# Patient Record
Sex: FEMALE | Race: WHITE | Employment: OTHER | URBAN - METROPOLITAN AREA
[De-identification: names, ages, dates, MRNs, and addresses within clinical notes are randomized per-mention and may not be internally consistent; named-entity substitution may affect disease eponyms.]

---

## 2017-01-17 ENCOUNTER — HOSPITAL ENCOUNTER (OUTPATIENT)
Dept: MAMMOGRAPHY | Age: 72
Discharge: HOME | End: 2017-01-17
Attending: FAMILY MEDICINE
Payer: MEDICARE

## 2017-01-17 DIAGNOSIS — Z12.31 VISIT FOR SCREENING MAMMOGRAM: ICD-10-CM

## 2017-01-17 PROCEDURE — 77063 BREAST TOMOSYNTHESIS BI: CPT

## 2017-11-08 ENCOUNTER — TELEPHONE (OUTPATIENT)
Dept: CARDIOLOGY CLINIC | Age: 72
End: 2017-11-08

## 2017-11-08 NOTE — TELEPHONE ENCOUNTER
Pt would like to speak with you regarding her BP medication cost.  Pt can be reached at [phone].       Thank you,  Nancy Curry

## 2017-11-08 NOTE — TELEPHONE ENCOUNTER
She said she has been taking benicar for years but it is costing $1500/year ? I told her that Dr Emily Armando could not make any recommendations since we have not seen her in over 2 years. I told her to expect a call from my .

## 2018-01-22 ENCOUNTER — HOSPITAL ENCOUNTER (OUTPATIENT)
Dept: MAMMOGRAPHY | Age: 73
Discharge: HOME | End: 2018-01-22
Attending: FAMILY MEDICINE
Payer: MEDICARE

## 2018-01-22 DIAGNOSIS — Z12.39 SCREENING BREAST EXAMINATION: ICD-10-CM

## 2018-01-22 PROCEDURE — 77063 BREAST TOMOSYNTHESIS BI: CPT

## 2018-05-10 RX ORDER — VALSARTAN AND HYDROCHLOROTHIAZIDE 160; 12.5 MG/1; MG/1
1 TABLET, FILM COATED ORAL DAILY
COMMUNITY
End: 2019-03-01

## 2018-05-10 NOTE — PERIOP NOTES
[address] [place] [address] Dr Coley Preprocedure Instructions      1. On the day of your surgery, please report to registration located on the 2nd floor of the  [facility]. yes    2. You must have a responsible adult to drive you to the hospital, stay at the hospital during your procedure and drive you home. If they leave your procedure will not be started (no exceptions). yes    3. Do not have anything to eat or drink (including water, gum, mints, coffee, and juice) after midnight. This does not apply to the medications you were instructed to take by your physician. yesIf you are currently taking Plavix, Coumadin, Aspirin, or other blood-thinning agents, contact your physician for special instructions. not applicable,    4. If you are having a procedure that requires bowel prep: We recommend that you have only clear liquids the day before your procedure and begin your bowel prep by 5:00 pm.  You may continue to drink clear liquids until midnight. If for any reason you are not able to complete your prep please notify your physician immediately. yes    5. Have a list of all current medications, including vitamins, herbal supplements and any other over the counter medications. yes    6. If you wear glasses, contacts, dentures and/or hearing aids, they may be removed prior to procedure, please bring a case to store them in. yes    7. You should understand that if you do not follow these instructions your procedure may be cancelled. If your physical condition changes (I.e. fever, cold or flu) please contact your doctor as soon as possible. 8. It is important that you be on time.   If for any reason you are unable to keep your appointment please call [phone] the day of or your physicians office prior to your scheduled procedure

## 2018-05-15 ENCOUNTER — ANESTHESIA EVENT (OUTPATIENT)
Dept: ENDOSCOPY | Age: 73
End: 2018-05-15
Payer: MEDICARE

## 2018-05-15 ENCOUNTER — HOSPITAL ENCOUNTER (OUTPATIENT)
Age: 73
Setting detail: OUTPATIENT SURGERY
Discharge: HOME | End: 2018-05-15
Attending: SPECIALIST | Admitting: SPECIALIST
Payer: MEDICARE

## 2018-05-15 ENCOUNTER — ANESTHESIA (OUTPATIENT)
Dept: ENDOSCOPY | Age: 73
End: 2018-05-15
Payer: MEDICARE

## 2018-05-15 VITALS
HEIGHT: 65 IN | HEART RATE: 67 BPM | SYSTOLIC BLOOD PRESSURE: 138 MMHG | BODY MASS INDEX: 24.49 KG/M2 | RESPIRATION RATE: 22 BRPM | TEMPERATURE: 97.6 F | OXYGEN SATURATION: 94 % | WEIGHT: 147 LBS | DIASTOLIC BLOOD PRESSURE: 68 MMHG

## 2018-05-15 PROCEDURE — 74011000250 HC RX REV CODE- 250

## 2018-05-15 PROCEDURE — 77030013992 HC SNR POLYP ENDOSC BSC -B: Performed by: SPECIALIST

## 2018-05-15 PROCEDURE — 74011250636 HC RX REV CODE- 250/636: Performed by: PHYSICIAN ASSISTANT

## 2018-05-15 PROCEDURE — 74011250636 HC RX REV CODE- 250/636

## 2018-05-15 PROCEDURE — 76060000031 HC ANESTHESIA FIRST 0.5 HR: Performed by: SPECIALIST

## 2018-05-15 PROCEDURE — 88305 TISSUE EXAM BY PATHOLOGIST: CPT | Performed by: SPECIALIST

## 2018-05-15 PROCEDURE — 76040000019: Performed by: SPECIALIST

## 2018-05-15 RX ORDER — MIDAZOLAM HYDROCHLORIDE 1 MG/ML
.25-5 INJECTION, SOLUTION INTRAMUSCULAR; INTRAVENOUS AS NEEDED
Status: DISCONTINUED | OUTPATIENT
Start: 2018-05-15 | End: 2018-05-15 | Stop reason: HOSPADM

## 2018-05-15 RX ORDER — ATROPINE SULFATE 0.1 MG/ML
0.5 INJECTION INTRAVENOUS
Status: DISCONTINUED | OUTPATIENT
Start: 2018-05-15 | End: 2018-05-15 | Stop reason: HOSPADM

## 2018-05-15 RX ORDER — PROPOFOL 10 MG/ML
INJECTION, EMULSION INTRAVENOUS AS NEEDED
Status: DISCONTINUED | OUTPATIENT
Start: 2018-05-15 | End: 2018-05-15 | Stop reason: HOSPADM

## 2018-05-15 RX ORDER — SODIUM CHLORIDE 9 MG/ML
50 INJECTION, SOLUTION INTRAVENOUS CONTINUOUS
Status: DISCONTINUED | OUTPATIENT
Start: 2018-05-15 | End: 2018-05-15 | Stop reason: HOSPADM

## 2018-05-15 RX ORDER — DEXTROMETHORPHAN/PSEUDOEPHED 2.5-7.5/.8
1.2 DROPS ORAL
Status: DISCONTINUED | OUTPATIENT
Start: 2018-05-15 | End: 2018-05-15 | Stop reason: HOSPADM

## 2018-05-15 RX ORDER — EPINEPHRINE 0.1 MG/ML
1 INJECTION INTRACARDIAC; INTRAVENOUS
Status: DISCONTINUED | OUTPATIENT
Start: 2018-05-15 | End: 2018-05-15 | Stop reason: HOSPADM

## 2018-05-15 RX ORDER — SODIUM CHLORIDE 9 MG/ML
INJECTION, SOLUTION INTRAVENOUS
Status: DISCONTINUED | OUTPATIENT
Start: 2018-05-15 | End: 2018-05-15 | Stop reason: HOSPADM

## 2018-05-15 RX ORDER — PROPOFOL 10 MG/ML
INJECTION, EMULSION INTRAVENOUS
Status: DISCONTINUED | OUTPATIENT
Start: 2018-05-15 | End: 2018-05-15 | Stop reason: HOSPADM

## 2018-05-15 RX ORDER — LIDOCAINE HYDROCHLORIDE 20 MG/ML
INJECTION, SOLUTION EPIDURAL; INFILTRATION; INTRACAUDAL; PERINEURAL AS NEEDED
Status: DISCONTINUED | OUTPATIENT
Start: 2018-05-15 | End: 2018-05-15 | Stop reason: HOSPADM

## 2018-05-15 RX ORDER — FLUMAZENIL 0.1 MG/ML
0.2 INJECTION INTRAVENOUS
Status: DISCONTINUED | OUTPATIENT
Start: 2018-05-15 | End: 2018-05-15 | Stop reason: HOSPADM

## 2018-05-15 RX ORDER — FENTANYL CITRATE 50 UG/ML
25 INJECTION, SOLUTION INTRAMUSCULAR; INTRAVENOUS AS NEEDED
Status: DISCONTINUED | OUTPATIENT
Start: 2018-05-15 | End: 2018-05-15 | Stop reason: HOSPADM

## 2018-05-15 RX ORDER — NALOXONE HYDROCHLORIDE 0.4 MG/ML
0.4 INJECTION, SOLUTION INTRAMUSCULAR; INTRAVENOUS; SUBCUTANEOUS
Status: DISCONTINUED | OUTPATIENT
Start: 2018-05-15 | End: 2018-05-15 | Stop reason: HOSPADM

## 2018-05-15 RX ADMIN — PROPOFOL 100 MCG/KG/MIN: 10 INJECTION, EMULSION INTRAVENOUS at 09:07

## 2018-05-15 RX ADMIN — SODIUM CHLORIDE: 9 INJECTION, SOLUTION INTRAVENOUS at 08:45

## 2018-05-15 RX ADMIN — LIDOCAINE HYDROCHLORIDE 60 MG: 20 INJECTION, SOLUTION EPIDURAL; INFILTRATION; INTRACAUDAL; PERINEURAL at 09:07

## 2018-05-15 RX ADMIN — SODIUM CHLORIDE 50 ML/HR: 900 INJECTION, SOLUTION INTRAVENOUS at 08:27

## 2018-05-15 RX ADMIN — PROPOFOL 70 MG: 10 INJECTION, EMULSION INTRAVENOUS at 09:07

## 2018-05-15 NOTE — H&P
75 y.o. female for open access colonoscopy for screening   Additional data for completion of the targeted pre-endoscopy H&P will be provided under 'H&P interval notes'. Please see that document which will be attached to this.   Nicole Alvarez MD  Colon 10 years ago woogen

## 2018-05-15 NOTE — ANESTHESIA POSTPROCEDURE EVALUATION
Post-Anesthesia Evaluation and Assessment    Patient: César Varela MRN: [id number]  SSN: [id number]    YOB: 1945  Age: 75 y.o. Sex: female       Cardiovascular Function/Vital Signs  Visit Vitals    /66    Pulse 66    Temp 36.4 °C (97.6 °F)    Resp 12    Ht 5' 5.05\" (1.652 m)    Wt 66.7 kg (147 lb)    SpO2 92%    Breastfeeding No    BMI 24.42 kg/m2       Patient is status post MAC anesthesia for Procedure(s):  COLONOSCOPY  ENDOSCOPIC POLYPECTOMY. Nausea/Vomiting: None    Postoperative hydration reviewed and adequate. Pain:  Pain Scale 1: Numeric (0 - 10) (05/15/18 0942)  Pain Intensity 1: 0 (05/15/18 0942)   Managed    Neurological Status: At baseline    Mental Status and Level of Consciousness: Arousable    Pulmonary Status:   O2 Device: Room air (05/15/18 0942)   Adequate oxygenation and airway patent    Complications related to anesthesia: None    Post-anesthesia assessment completed.  No concerns    Signed By: Haylee Carrion MD     May 15, 2018

## 2018-05-15 NOTE — PERIOP NOTES
Report from Shauna, [facility], see anesthesia record. ABD remains soft and non-tender post procedure. Pt has no complaints at this time and tolerated the procedure well. Endoscope was pre-cleaned at bedside immediately following procedure by Jennifer Warner.

## 2018-05-15 NOTE — ANESTHESIA PREPROCEDURE EVALUATION
Anesthetic History   No history of anesthetic complications            Review of Systems / Medical History  Patient summary reviewed and pertinent labs reviewed    Pulmonary  Within defined limits            Pertinent negatives: No smoker     Neuro/Psych   Within defined limits           Cardiovascular    Hypertension          Hyperlipidemia    Exercise tolerance: >4 METS     GI/Hepatic/Renal     GERD: well controlled           Endo/Other             Other Findings   Comments: Seasonal allergies           Physical Exam    Airway  Mallampati: II  TM Distance: < 4 cm  Neck ROM: normal range of motion   Mouth opening: Normal     Cardiovascular  Regular rate and rhythm,  S1 and S2 normal,  no murmur, click, rub, or gallop  Rhythm: regular  Rate: normal         Dental  No notable dental hx       Pulmonary  Breath sounds clear to auscultation               Abdominal         Other Findings            Anesthetic Plan    ASA: 2  Anesthesia type: MAC          Induction: Intravenous  Anesthetic plan and risks discussed with: Patient

## 2018-05-15 NOTE — DISCHARGE INSTRUCTIONS
[address] [facility] MARITZA Pollard MD  [phone]      May 15, 2018    Anton Pearson  YOB: 1945    COLONOSCOPY DISCHARGE INSTRUCTIONS    If there is redness at IV site you should apply warm compress to area. If redness or soreness persist contact Dr. Charissa Pollard' or your primary care doctor. There may be a slight amount of blood passed from the rectum. Gaseous discomfort may develop, but walking, belching will help relieve this. You may not operate a vehicle for 12 hours  You may not operate machinery or dangerous appliances for rest of today  You may not drink alcoholic beverages for 12 hours  Avoid making any critical decisions for 24 hours    DIET:  You may resume your normal diet, but some patients find that heavy or large meals may lead to indigestion or vomiting. I suggest a light meal as first food intake. MEDICATIONS:  The use of some over-the-counter pain medication may lead to bleeding after colon biopsies or polyp removal.  Tylenol (also called acetaminophen) is safe to take even if you have had colonoscopy with polyp removal.  Based on the procedure you had today you may not safely take aspirin or aspirin-like products for the next ten (10) days. Remember that Tylenol (also called acetaminophen) is safe to take after colonoscopy even if you have had biopsies or polyps removed. ACTIVITY:  You may resume your normal household activities, but it is recommended that you spend the remainder of the day resting -  avoid any strenuous activity. CALL DR. Porsha Mclaughlin' OFFICE IF:  Increasing pain, nausea, vomiting  Abdominal distension (swelling)  Significant new or increased bleeding (oral or rectal)  Fever/Chills  Chest pain/shortness of breath                       Additional instructions:   No aspirin 10 days  We found one small polyp and removed that.   I will contact you by letter in about a week with the polyp results and advice about next colonoscopy. It was an honor to be your doctor today. Please let me or my office staff know if you have any feedback about today's procedure. Michelle Alvarez MD    Colonoscopy saves lives, and can prevent colon cancer. Everyone aged 48 or older needs colonoscopy.   Tell your family and friends: get the test!

## 2018-05-15 NOTE — INTERVAL H&P NOTE
Pre-Endoscopy H&P Update  Chief complaint/HPI/ROS:  The indication for the procedure, the patient's history and the patient's current medications are reviewed prior to the procedure and that data is reported on the H&P to which this document is attached. Any significant complaints with regard to organ systems will be noted, and if not mentioned then a review of systems is not contributory. Past Medical History:   Diagnosis Date    Borderline diabetes     Essential hypertension     FH ischemic heart disease     Hyperlipidemia       Past Surgical History:   Procedure Laterality Date    HX HEART CATHETERIZATION  1996    HX HEENT      cataracts     Social   Social History   Substance Use Topics    Smoking status: Never Smoker    Smokeless tobacco: Never Used    Alcohol use No      Family History   Problem Relation Age of Onset    Diabetes Mother     Diabetes Father     Heart Attack Father     Heart Attack Brother       Allergies   Allergen Reactions    Sulfa (Sulfonamide Antibiotics) Unknown (comments)      Prior to Admission Medications   Prescriptions Last Dose Informant Patient Reported? Taking? L GASSERI/B BIFIDUM/B LONGUM (PROBIOTIC COLON CARE PO) 4/15/2018 at Unknown time  Yes Yes   Sig: Take  by mouth. LORATADINE (ALAVERT PO) 5/14/2018 at Unknown time  Yes Yes   Sig: Take  by mouth. amLODIPine (NORVASC) 2.5 mg tablet 5/15/2018 at Unknown time  Yes Yes   Sig: Take  by mouth daily. asa-acetaminophen-caff-buffers [facility]) 812-055-27 mg tab 5/12/2018  Yes No   Sig: Take  by mouth. aspirin delayed-release 81 mg tablet 5/14/2018 at Unknown time  Yes Yes   Sig: Take  by mouth daily. calcium carbonate-simethicone (MAGGI-SELTZER HEARTBURN+GAS) 750-80 mg chew 4/15/2018 at Unknown time  Yes Yes   Sig: Take  by mouth.   calcium-cholecalciferol, d3, (CALCIUM 600 + D) 600-125 mg-unit tab 5/14/2018 at Unknown time  Yes Yes   Sig: Take  by mouth daily.    famotidine (PEPCID AC) 10 mg tablet 5/11/2018 Yes No   Sig: Take 10 mg by mouth two (2) times a day. multivitamins-minerals-lutein (MULTIVITAMIN 50 PLUS) tab tablet 5/14/2018 at Unknown time  Yes Yes   Sig: Take 1 Tab by mouth daily. simvastatin (ZOCOR) 40 mg tablet 5/14/2018 at Unknown time  Yes Yes   Sig: Take  by mouth nightly. valsartan-hydroCHLOROthiazide (DIOVAN-HCT) 160-12.5 mg per tablet 5/15/2018 at Unknown time  Yes Yes   Sig: Take 1 Tab by mouth daily. Facility-Administered Medications: None       PHYSICAL EXAM:  The patient is examined immediately prior to the procedure. Visit Vitals    /74    Pulse 72    Temp 97.6 °F (36.4 °C)    Resp 13    Ht 5' 5.05\" (1.652 m)    Wt 66.7 kg (147 lb)    SpO2 100%    Breastfeeding No    BMI 24.42 kg/m2     Gen: Appears comfortable, no distress. Pulm: comfortable respirations with no abnormal audible breath sounds  CV: heart regular, well perfused  GI: abdomen flat. PLAN:  Informed consent discussion held, patient afforded an opportunity to ask questions and all questions answered. After being advised of the risks, benefits, and alternatives, the patient requested that we proceed and indicated so on a written consent form. Will proceed with procedure as planned.   Victor M Hoffman MD

## 2018-05-15 NOTE — IP AVS SNAPSHOT
[address] 
[phone] Patient: Elvi Esparza MRN: [id number] SE/3/2164 About your hospitalization You were admitted on:  May 15, 2018 You last received care in the:  [facility] ENDOSCOPY You were discharged on:  May 15, 2018 Why you were hospitalized Your primary diagnosis was:  Not on File Follow-up Information None Discharge Orders None A check shane indicates which time of day the medication should be taken. My Medications CONTINUE taking these medications Instructions Each Dose to Equal  
 Morning Noon Evening Bedtime ALAVERT PO Your last dose was: Your next dose is: Take  by mouth. MAGGI-SELTZER HEARTBURN+-80 mg documistic Generic drug:  calcium carbonate-simethicone Your last dose was: Your next dose is: Take  by mouth. amLODIPine 2.5 mg tablet Commonly known as:  Max Vega Your last dose was: Your next dose is: Take  by mouth daily. aspirin delayed-release 81 mg tablet Your last dose was: Your next dose is: Take  by mouth daily. CALCIUM 600 + D 600-125 mg-unit Tab Generic drug:  calcium-cholecalciferol (d3) Your last dose was: Your next dose is: Take  by mouth daily. MULTIVITAMIN 50 PLUS Tab tablet Generic drug:  multivitamins-minerals-lutein Your last dose was: Your next dose is: Take 1 Tab by mouth daily. 1 Tab PEPCID AC 10 mg tablet Generic drug:  famotidine Your last dose was: Your next dose is: Take 10 mg by mouth two (2) times a day. 10 mg  PROBIOTIC COLON CARE PO  
   
 Your last dose was: Your next dose is: Take  by mouth. simvastatin 40 mg tablet Commonly known as:  ZOCOR Your last dose was: Your next dose is: Take  by mouth nightly. valsartan-hydroCHLOROthiazide 160-12.5 mg per tablet Commonly known as:  DIOVAN-HCT Your last dose was: Your next dose is: Take 1 Tab by mouth daily. 1 Tab  
    
   
   
   
  
 VANQUISH 541-536-05 mg Tab Generic drug:  asa-acetaminophen-caff-buffers Your last dose was: Your next dose is: Take  by mouth. Discharge Instructions [address] Ric Hwang. Anjali Hayden, [address] [place] [address] 
[phone] May 15, 2018 Angel Ramsay YOB: 1945 COLONOSCOPY DISCHARGE INSTRUCTIONS If there is redness at IV site you should apply warm compress to area. If redness or soreness persist contact Dr. Anjali Hayden' or your primary care doctor. There may be a slight amount of blood passed from the rectum. Gaseous discomfort may develop, but walking, belching will help relieve this. You may not operate a vehicle for 12 hours You may not operate machinery or dangerous appliances for rest of today You may not drink alcoholic beverages for 12 hours Avoid making any critical decisions for 24 hours DIET: 
You may resume your normal diet, but some patients find that heavy or large meals may lead to indigestion or vomiting. I suggest a light meal as first food intake.  
 
MEDICATIONS: 
The use of some over-the-counter pain medication may lead to bleeding after colon biopsies or polyp removal.  Tylenol (also called acetaminophen) is safe to take even if you have had colonoscopy with polyp removal.  Based on the procedure you had today you may not safely take aspirin or aspirin-like products for the next ten (10) days. Remember that Tylenol (also called acetaminophen) is safe to take after colonoscopy even if you have had biopsies or polyps removed. ACTIVITY: 
You may resume your normal household activities, but it is recommended that you spend the remainder of the day resting -  avoid any strenuous activity. CALL DR. Jeni Paul' OFFICE IF: Increasing pain, nausea, vomiting Abdominal distension (swelling) Significant new or increased bleeding (oral or rectal) Fever/Chills Chest pain/shortness of breath Additional instructions:  
No aspirin 10 days We found one small polyp and removed that. I will contact you by letter in about a week with the polyp results and advice about next colonoscopy. It was an honor to be your doctor today. Please let me or my office staff know if you have any feedback about today's procedure. Ricky Mendoza MD 
 
Colonoscopy saves lives, and can prevent colon cancer. Everyone aged 48 or older needs colonoscopy. Tell your family and friends: get the test! 
 
 
 
 
  
  
  
Introducing [employer]! Dear Adriana Marcano: 
Thank you for requesting a [employer] account. Our records indicate that you already have an active [employer] account. You can access your account anytime at [email or web address] Did you know that you can access your hospital and ER discharge instructions at any time in [employer]? You can also review all of your test results from your hospital stay or ER visit. Additional Information If you have questions, please visit the Frequently Asked Questions section of the [employer] website at [email or web address] Remember, [employer] is NOT to be used for urgent needs. For medical emergencies, dial 911. Now available from your iPhone and Android! Introducing Jose Luis Garg As a [facility] patient, I wanted to make you aware of our electronic visit tool called Jose Luis Garg. [employer] allows you to connect within minutes with a medical provider 24 hours a day, seven days a week via a mobile device or tablet or logging into a secure website from your computer. You can access Jose Luis Nelsonfin from anywhere in the [place]. A virtual visit might be right for you when you have a simple condition and feel like you just dont want to get out of bed, or cant get away from work for an appointment, when your regular [facility] provider is not available (evenings, weekends or holidays), or when youre out of town and need minor care. Electronic visits cost only $49 and if the [employer] provider determines a prescription is needed to treat your condition, one can be electronically transmitted to a nearby pharmacy*. Please take a moment to enroll today if you have not already done so. The enrollment process is free and takes just a few minutes. To enroll, please download the [employer] agus to your tablet or phone, or visit [email or web address] to enroll on your computer. And, as an [address] [place] [address] patient with a [email or web address] account, the results of your visits will be scanned into your electronic medical record and your primary care provider will be able to view the scanned results. We urge you to continue to see your regular [facility] provider for your ongoing medical care. And while your primary care provider may not be the one available when you seek a Jose Luis Osmanangfin virtual visit, the peace of mind you get from getting a real diagnosis real time can be priceless. For more information on Jose Luis Osmanangfin, view our Frequently Asked Questions (FAQs) at [email or web address]. Sincerely, 
 
Dianah Sicard, MD 
Chief Medical Officer Jaclyn8 Angie Varela *:  certain medications cannot be prescribed via Jose Luis Garg Providers Seen During Your Hospitalization Provider Specialty Primary office phone Pernell Ness MD Gastroenterology [phone] Your Primary Care Physician (PCP) Primary Care Physician Office Phone Office Fax Erwin Brown [phone] You are allergic to the following Allergen Reactions Sulfa (Sulfonamide Antibiotics) Unknown (comments) Recent Documentation Height Weight Breastfeeding? BMI OB Status Smoking Status 1.652 m 66.7 kg No 24.42 kg/m2 Postmenopausal Never Smoker Emergency Contacts Name Discharge Info Relation Home Work Mobile Pedro Torres DISCHARGE CAREGIVER [3] Spouse [3]   [phone] [address] CAREGIVER [3] Child [2] [phone] Patient Belongings The following personal items are in your possession at time of discharge: 
  Dental Appliances: None  Visual Aid: None Please provide this summary of care documentation to your next provider. Signatures-by signing, you are acknowledging that this After Visit Summary has been reviewed with you and you have received a copy. Patient Signature:  ____________________________________________________________ Date:  ____________________________________________________________  
  
Yanelis Larson Provider Signature:  ____________________________________________________________ Date:  ____________________________________________________________

## 2018-05-15 NOTE — PROCEDURES
[address] [facility] MARITZA Pandya MD  [phone]      May 15, 2018    Colonoscopy Procedure Note  Maria E Cox  :  1945  [place] Medical Record Number: [id number]    Indications:     Screening colonoscopy  PCP:  Alexandro Jenkins MD  Anesthesia/Sedation: Conscious Sedation/Moderate Sedation  Endoscopist:  Dr. Anahi Quinteros  Complications:  None  Estimated Blood Loss:  None    Permit:  The indications, risks, benefits and alternatives were reviewed with the patient or their decision maker who was provided an opportunity to ask questions and all questions were answered. The specific risks of colonoscopy with conscious sedation were reviewed, including but not limited to anesthetic complication, bleeding, adverse drug reaction, missed lesion, infection, IV site reactions, and intestinal perforation which would lead to the need for surgical repair. Alternatives to colonoscopy including radiographic imaging, observation without testing, or laboratory testing were reviewed including the limitations of those alternatives. After considering the options and having all their questions answered, the patient or their decision maker provided both verbal and written consent to proceed. Procedure in Detail:  After obtaining informed consent, positioning of the patient in the left lateral decubitus position, and conduction of a pre-procedure pause or \"time out\" the endoscope was introduced into the anus and advanced to the cecum, which was identified by the ileocecal valve and appendiceal orifice. The quality of the colonic preparation was good. A careful inspection was made as the colonoscope was withdrawn, findings and interventions are described below. Findings:   Ascending colon polyp 4mm removed with cold snare and all samples retrieved.   In the rectum, medium internal hemorrhoids are noted without bleeding. Specimens:    See above    Complications:   None; patient tolerated the procedure well. Impression:  Colon polyp and hemorrhoids. Recommendations:     - Await pathology. Thank you for entrusting me with this patient's care. Please do not hesitate to contact me with any questions or if I can be of assistance with any of your other patients' GI needs.     Signed By: Enmanuel Ledbetter MD                        May 15, 2018

## 2019-01-25 ENCOUNTER — HOSPITAL ENCOUNTER (OUTPATIENT)
Dept: MAMMOGRAPHY | Age: 74
Discharge: HOME | End: 2019-01-25
Attending: FAMILY MEDICINE
Payer: MEDICARE

## 2019-01-25 DIAGNOSIS — Z12.39 SCREENING BREAST EXAMINATION: ICD-10-CM

## 2019-01-25 PROCEDURE — 77063 BREAST TOMOSYNTHESIS BI: CPT

## 2019-03-01 ENCOUNTER — OFFICE VISIT (OUTPATIENT)
Dept: CARDIOLOGY CLINIC | Age: 74
End: 2019-03-01

## 2019-03-01 VITALS
RESPIRATION RATE: 18 BRPM | SYSTOLIC BLOOD PRESSURE: 156 MMHG | WEIGHT: 153.4 LBS | DIASTOLIC BLOOD PRESSURE: 86 MMHG | BODY MASS INDEX: 25.56 KG/M2 | HEIGHT: 65 IN | HEART RATE: 81 BPM

## 2019-03-01 DIAGNOSIS — I10 ESSENTIAL HYPERTENSION: Primary | ICD-10-CM

## 2019-03-01 RX ORDER — HYDROCHLOROTHIAZIDE 25 MG/1
12.5 TABLET ORAL DAILY
Qty: 30 TAB | Refills: 12 | Status: SHIPPED | OUTPATIENT
Start: 2019-03-01 | End: 2019-04-11 | Stop reason: SDUPTHER

## 2019-03-01 RX ORDER — VALSARTAN 160 MG/1
160 TABLET ORAL DAILY
Qty: 30 TAB | Refills: 12 | Status: SHIPPED | OUTPATIENT
Start: 2019-03-01 | End: 2019-04-11 | Stop reason: SDUPTHER

## 2019-03-01 RX ORDER — AMLODIPINE BESYLATE 5 MG/1
10 TABLET ORAL DAILY
Qty: 30 TAB | Refills: 0
Start: 2019-03-01 | End: 2019-04-11 | Stop reason: SDUPTHER

## 2019-03-01 RX ORDER — MECLIZINE HYDROCHLORIDE 25 MG/1
TABLET ORAL AS NEEDED
COMMUNITY

## 2019-03-01 RX ORDER — VALSARTAN 160 MG/1
320 TABLET ORAL DAILY
COMMUNITY
End: 2019-03-01

## 2019-03-01 RX ORDER — SIMVASTATIN 20 MG/1
TABLET, FILM COATED ORAL
COMMUNITY

## 2019-03-01 NOTE — PROGRESS NOTES
Visit Vitals  /86 (BP 1 Location: Left arm)   Pulse 81   Resp 18   Ht 5' 5.05\" (1.652 m)   Wt 153 lb 6.4 oz (69.6 kg)   BMI 25.49 kg/m²       New patient.  Referred for HTN per Juan Lora NP.

## 2019-03-01 NOTE — PROGRESS NOTES
Sharan Severs, MD. [facility] [place]              Patient: Anton Pearson  : 1945      Today's Date: 3/1/2019          HISTORY OF PRESENT ILLNESS:     History of Present Illness:  Referred for HTN     She was in [place] and eating poorly and came back and BP was high. PCP increased amlodipine and valsartan --> this is making her dizzy and nauseated. BP usually around 145/85 or so. She feels fine - felt better before increasing valsartan. No CP (just a twinge infrequently). No exertional CP. Some PEDRAZA. PAST MEDICAL HISTORY:     Past Medical History:   Diagnosis Date    Borderline diabetes     Essential hypertension     FH ischemic heart disease     Hyperlipidemia        Past Surgical History:   Procedure Laterality Date    COLONOSCOPY N/A 5/15/2018    COLONOSCOPY performed by Jose Peña MD at [address] [place] [address]    HX HEENT      cataracts         MEDICATIONS:     Current Outpatient Medications   Medication Sig Dispense Refill    valsartan (DIOVAN) 160 mg tablet Take 320 mg by mouth daily.  simvastatin (ZOCOR) 20 mg tablet Take  by mouth nightly.  meclizine (ANTIVERT) 25 mg tablet Take  by mouth as needed.  aspirin-acetaminophen-caffeine (EXCEDRIN ES) 250-250-65 mg per tablet Take 1 Tab by mouth as needed for Headache.  famotidine (PEPCID AC PO) Take  by mouth as needed.  amLODIPine (NORVASC) 5 mg tablet Take 2 Tabs by mouth daily. 30 Tab 0    calcium-cholecalciferol, d3, (CALCIUM 600 + D) 600-125 mg-unit tab Take  by mouth daily.  LORATADINE (ALAVERT PO) Take  by mouth.  multivitamins-minerals-lutein (MULTIVITAMIN 50 PLUS) tab tablet Take 1 Tab by mouth daily.  L GASSERI/B BIFIDUM/B LONGUM (PROBIOTIC COLON CARE PO) Take  by mouth.  aspirin delayed-release 81 mg tablet Take  by mouth daily.  famotidine (PEPCID AC) 10 mg tablet Take 10 mg by mouth as needed.       calcium carbonate-simethicone (MAGGI-SELTZER HEARTBURN+GAS) 750-80 mg chew Take  by mouth. Allergies   Allergen Reactions    Losartan Other (comments)     Dizziness    Sulfa (Sulfonamide Antibiotics) Unknown (comments)           SOCIAL HISTORY:     Social History     Tobacco Use    Smoking status: Never Smoker    Smokeless tobacco: Never Used   Substance Use Topics    Alcohol use: No    Drug use: No         FAMILY HISTORY:     Family History   Problem Relation Age of Onset    Diabetes Mother     Diabetes Father     Heart Attack Father     Heart Attack Brother            REVIEW OF SYMPTOMS:     Review of Symptoms:  Constitutional: Negative for fever, chills  HEENT: Negative for nosebleeds, tinnitus, and vision changes. Respiratory: Negative for cough, wheezing  Cardiovascular: Negative for orthopnea, claudication, syncope, and PND. Gastrointestinal: Negative for abdominal pain, diarrhea, melena. Genitourinary: Negative for dysuria  Musculoskeletal: Negative for myalgias. Skin: Negative for rash  Heme: No problems bleeding. Neurological: Negative for speech change and focal weakness. PHYSICAL EXAM:     Physical Exam:  Visit Vitals  /86 (BP 1 Location: Left arm)   Pulse 81   Resp 18   Ht 5' 5.05\" (1.652 m)   Wt 153 lb 6.4 oz (69.6 kg)   BMI 25.49 kg/m²     Patient appears generally well, mood and affect are appropriate and pleasant. HEENT:  Hearing intact, non-icteric, normocephalic, atraumatic. Neck Exam: Supple, No JVD or carotid bruits. Lung Exam: Clear to auscultation, even breath sounds. Cardiac Exam: Regular rate and rhythm with no murmur  Abdomen: Soft, non-tender, normal bowel sounds. No bruits or masses. Extremities: Moves all ext well. No lower extremity edema. Vascular: 2+ dorsalis pedis pulses bilaterally.   Psych: Appropriate affect  Neuro - Grossly intact        LABS / OTHER STUDIES:     Labs 1/11/19 - chol 167, HDL 63, LDL 84, , A1c 6.1, CBC OK, CMP OK, TSH 2,       CARDIAC DIAGNOSTICS:     Cardiac Evaluation Includes:    Stress Echo 9/30/15 - walked 9:15 (10.9 METs). There was mild chest discomfort at rest, but this did not get any worse with exercise. Maximal systolic blood pressure during stress was 170 mmHg. The stress ECG was negative for ischemia. Normal Stress echo. Additional echo findings - normal RV size and function. Mild MR and Trivial TR. Normal AV velocity. Normal atrial sizes. Insufficient TR for RVSP. EKG 3/1/19 - NSR, normal       ASSESSMENT AND PLAN:     Assessment and Plan:  1) HTN  - BP high lately despite a couple of meds. Higher dose Valsartan is making her dizzy. - I discussed switching to losartan but that made her dizzy in past  - Decided to cut Valsartan back to 160 mg which she tolerated and she agreed to low dose HCTZ 12.5 mg daily. Cont Amlodipine. - She is leaving the country in a couple of days and will be gentle with our changes for now -- I told her we'd be OK with mild HTN for now and will get it better controlled long term  - Check labs in 3-4 weeks. 2) See me back in one month. Patient expressed understanding of the plan - questions were answered. She leaves for [facility] on Tuesday for 3 weeks. Daughter is Magnet Nurse at [employer]. Sara Delcid MD, [place] [address].  [address], [address] [place] [address]  Ph: [phone]   Ph [phone]

## 2019-04-09 ENCOUNTER — HOSPITAL ENCOUNTER (OUTPATIENT)
Dept: LAB | Age: 74
Discharge: HOME | End: 2019-04-09
Payer: MEDICARE

## 2019-04-09 PROCEDURE — 36415 COLL VENOUS BLD VENIPUNCTURE: CPT

## 2019-04-09 PROCEDURE — 84244 ASSAY OF RENIN: CPT

## 2019-04-09 PROCEDURE — 80048 BASIC METABOLIC PNL TOTAL CA: CPT

## 2019-04-11 ENCOUNTER — OFFICE VISIT (OUTPATIENT)
Dept: CARDIOLOGY CLINIC | Age: 74
End: 2019-04-11

## 2019-04-11 VITALS
RESPIRATION RATE: 16 BRPM | HEIGHT: 65 IN | DIASTOLIC BLOOD PRESSURE: 80 MMHG | OXYGEN SATURATION: 99 % | HEART RATE: 76 BPM | WEIGHT: 154 LBS | BODY MASS INDEX: 25.66 KG/M2 | SYSTOLIC BLOOD PRESSURE: 128 MMHG

## 2019-04-11 DIAGNOSIS — E78.5 HYPERLIPIDEMIA, UNSPECIFIED HYPERLIPIDEMIA TYPE: ICD-10-CM

## 2019-04-11 DIAGNOSIS — I10 ESSENTIAL HYPERTENSION: Primary | ICD-10-CM

## 2019-04-11 RX ORDER — VALSARTAN 160 MG/1
160 TABLET ORAL DAILY
Qty: 90 TAB | Refills: 3 | Status: SHIPPED | OUTPATIENT
Start: 2019-04-11 | End: 2020-01-06 | Stop reason: ALTCHOICE

## 2019-04-11 RX ORDER — AMLODIPINE BESYLATE 10 MG/1
10 TABLET ORAL DAILY
Qty: 90 TAB | Refills: 3 | Status: SHIPPED | OUTPATIENT
Start: 2019-04-11

## 2019-04-11 RX ORDER — HYDROCHLOROTHIAZIDE 25 MG/1
12.5 TABLET ORAL DAILY
Qty: 45 TAB | Refills: 3 | Status: SHIPPED | OUTPATIENT
Start: 2019-04-11 | End: 2019-06-26 | Stop reason: SDUPTHER

## 2019-04-11 NOTE — PROGRESS NOTES
Katalina Massey MD. [facility] [place]              Patient: Heidi King  : 1945      Today's Date: 2019          HISTORY OF PRESENT ILLNESS:     History of Present Illness:  Here for follow-up. Home BP readings look good. No complaints. No Cp or dizziness. Infrequent SOB. PAST MEDICAL HISTORY:     Past Medical History:   Diagnosis Date    Borderline diabetes     Essential hypertension     FH ischemic heart disease     Hyperlipidemia        Past Surgical History:   Procedure Laterality Date    COLONOSCOPY N/A 5/15/2018    COLONOSCOPY performed by Annamaria Estrada MD at [address] [place] [address]    HX HEENT      cataracts           MEDICATIONS:     Current Outpatient Medications   Medication Sig Dispense Refill    simvastatin (ZOCOR) 20 mg tablet Take  by mouth nightly.  meclizine (ANTIVERT) 25 mg tablet Take  by mouth as needed.  aspirin-acetaminophen-caffeine (EXCEDRIN ES) 250-250-65 mg per tablet Take 1 Tab by mouth as needed for Headache.  famotidine (PEPCID AC PO) Take  by mouth as needed.  amLODIPine (NORVASC) 5 mg tablet Take 2 Tabs by mouth daily. 30 Tab 0    valsartan (DIOVAN) 160 mg tablet Take 1 Tab by mouth daily. 30 Tab 12    hydroCHLOROthiazide (HYDRODIURIL) 25 mg tablet Take 0.5 Tabs by mouth daily. 30 Tab 12    calcium-cholecalciferol, d3, (CALCIUM 600 + D) 600-125 mg-unit tab Take  by mouth daily.  LORATADINE (ALAVERT PO) Take  by mouth.  multivitamins-minerals-lutein (MULTIVITAMIN 50 PLUS) tab tablet Take 1 Tab by mouth daily.  L GASSERI/B BIFIDUM/B LONGUM (PROBIOTIC COLON CARE PO) Take  by mouth.  aspirin delayed-release 81 mg tablet Take  by mouth daily.  calcium carbonate-simethicone (MAGGI-SELTZER HEARTBURN+GAS) 750-80 mg chew Take  by mouth.          Allergies   Allergen Reactions    Losartan Other (comments)     Dizziness    Sulfa (Sulfonamide Antibiotics) Unknown (comments) SOCIAL HISTORY:     Social History     Tobacco Use    Smoking status: Never Smoker    Smokeless tobacco: Never Used   Substance Use Topics    Alcohol use: No    Drug use: No         FAMILY HISTORY:     Family History   Problem Relation Age of Onset    Diabetes Mother     Diabetes Father     Heart Attack Father     Heart Attack Brother             REVIEW OF SYMPTOMS:      Review of Symptoms:  Constitutional: Negative for fever, chills  HEENT: Negative for nosebleeds, tinnitus, and vision changes. Respiratory: Negative for cough, wheezing  Cardiovascular: Negative for orthopnea, claudication, syncope, and PND. Gastrointestinal: Negative for abdominal pain, diarrhea, melena. Genitourinary: Negative for dysuria  Musculoskeletal: Negative for myalgias. Skin: Negative for rash  Heme: No problems bleeding. Neurological: Negative for speech change and focal weakness.               PHYSICAL EXAM:      Physical Exam:  Visit Vitals  /80 (BP 1 Location: Left arm, BP Patient Position: Sitting)   Pulse 76   Resp 16   Ht 5' 5\" (1.651 m)   Wt 154 lb (69.9 kg)   LMP  (LMP Unknown)   SpO2 99%   BMI 25.63 kg/m²       Patient appears generally well, mood and affect are appropriate and pleasant. HEENT:  Hearing intact, non-icteric, normocephalic, atraumatic. Neck Exam: Supple, No JVD   Lung Exam: Clear to auscultation, even breath sounds. Cardiac Exam: Regular rate and rhythm with no murmur  Abdomen: Soft, non-tender  Extremities: Moves all ext well. No lower extremity edema. Psych: Appropriate affect  Neuro - Grossly intact           LABS / OTHER STUDIES:      Labs 1/11/19 - chol 167, HDL 63, LDL 84, , A1c 6.1, CBC OK, CMP OK, TSH 2,           CARDIAC DIAGNOSTICS:      Cardiac Evaluation Includes:     Stress Echo 9/30/15 - walked 9:15 (10.9 METs).   There was mild chest discomfort at rest, but this did not get any worse with exercise.  Maximal systolic blood pressure during stress was 170 mmHg.  The stress ECG was negative for ischemia.  Normal Stress echo.   Additional echo findings - normal RV size and function. Mild MR and Trivial TR. Normal AV velocity. Normal atrial sizes. Insufficient TR for RVSP.     EKG 3/1/19 - NSR, normal         ASSESSMENT AND PLAN:      Assessment and Plan:  1) HTN  - BP looks good on current regimen which we'll continue. - Will get recent labs done this week to review     2) CV Risk Management   - work on healthy diet and exercise  - on a statin   - ASA not needed for her      3) See me back in one year. Patient expressed understanding of the plan - questions were answered. Daughter is Magnet Nurse at [facility] [place], [place], [address], [address]      [address]  [address] [place] [address], Parth Deshpandesworth, [address] [place] [address]  Ph: [phone]                               Ph [phone]

## 2019-04-11 NOTE — PROGRESS NOTES
Chief Complaint   Patient presents with    Follow-up    Hypertension    Shortness of Breath     Visit Vitals  /80 (BP 1 Location: Left arm, BP Patient Position: Sitting)   Pulse 76   Resp 16   Ht 5' 5\" (1.651 m)   Wt 154 lb (69.9 kg)   LMP  (LMP Unknown)   SpO2 99%   BMI 25.63 kg/m²       Fall Risk Assessment, last 12 mths 4/11/2019   Able to walk? Yes   Fall in past 12 months?  No

## 2019-04-12 LAB
ALDOST SERPL-MCNC: 5.4 NG/DL (ref 0–30)
BUN SERPL-MCNC: 17 MG/DL (ref 8–27)
BUN/CREAT SERPL: 24 (ref 12–28)
CALCIUM SERPL-MCNC: 9.7 MG/DL (ref 8.7–10.3)
CHLORIDE SERPL-SCNC: 96 MMOL/L (ref 96–106)
CO2 SERPL-SCNC: 27 MMOL/L (ref 20–29)
CREAT SERPL-MCNC: 0.71 MG/DL (ref 0.57–1)
GLUCOSE SERPL-MCNC: 126 MG/DL (ref 65–99)
POTASSIUM SERPL-SCNC: 4.6 MMOL/L (ref 3.5–5.2)
RENIN PLAS-CCNC: 0.61 NG/ML/HR (ref 0.17–5.38)
SODIUM SERPL-SCNC: 137 MMOL/L (ref 134–144)

## 2019-06-26 RX ORDER — HYDROCHLOROTHIAZIDE 25 MG/1
12.5 TABLET ORAL DAILY
Qty: 45 TAB | Refills: 3 | Status: SHIPPED | OUTPATIENT
Start: 2019-06-26 | End: 2019-10-10 | Stop reason: SDUPTHER

## 2019-06-26 NOTE — TELEPHONE ENCOUNTER
Refill for HCTZ 25 mg 1/2 tab daily per Dr. Karen Gregory.    [facility] 4/11/19 NOV 4/16/20

## 2019-06-26 NOTE — TELEPHONE ENCOUNTER
Pharmacy confirmed. Patient is requesting for a 90 day supply of Hydrochlorothiazide.      Phone: [phone]

## 2019-10-10 ENCOUNTER — TELEPHONE (OUTPATIENT)
Dept: CARDIOLOGY CLINIC | Age: 74
End: 2019-10-10

## 2019-10-10 RX ORDER — HYDROCHLOROTHIAZIDE 12.5 MG/1
12.5 TABLET ORAL DAILY
Qty: 90 TAB | Refills: 2 | Status: SHIPPED | OUTPATIENT
Start: 2019-10-10 | End: 2020-06-30

## 2019-10-10 NOTE — TELEPHONE ENCOUNTER
Per Dr. Wyatt Lizarraga VO:  EBD:6/07/4498  Future Appointments   Date Time Provider Osman Varghesei   4/16/2020  9:40 AM Gibson Cabello MD [facility]     Requested Prescriptions     Pending Prescriptions Disp Refills    hydroCHLOROthiazide (HYDRODIURIL) 12.5 mg tablet 90 Tab 3     Sig: Take 1 Tab by mouth daily.

## 2019-10-10 NOTE — TELEPHONE ENCOUNTER
Patient states she takes Hydrochlorothiazide 25 mg. She is requesting a refill but would like to know if the prescription can go out for 12.5 mg, so she won't have to cut the pills in half. She would also like a 90 day supply. Pharmacy was confirmed.    Phone: [phone]

## 2019-12-18 NOTE — TELEPHONE ENCOUNTER
Fax received from [facility] pharmacy on [address]. Valsartan 160 mg  \"Product on backorder/unavailable. Please change to something else. \"    Please advise.

## 2019-12-20 RX ORDER — CANDESARTAN 16 MG/1
16 TABLET ORAL DAILY
Qty: 30 TAB | Refills: 0 | OUTPATIENT
Start: 2019-12-20

## 2019-12-20 NOTE — TELEPHONE ENCOUNTER
Per ADRIANA Robins, NP \"You can change to candesartan 16mg/d.  If pt has home BP cuff, please check BP 1x daily for the next week.  She can call us with BP log to review via phone. [facility] if she gets dizzy or lightheaded. \"    Was on Losartan for a while. Had trouble with dizziness, etc.    Pt stated she has enough for a little while. Would like to hold off on the switch. Discussed revisiting this when it comes closer to her running out of meds.

## 2020-01-02 RX ORDER — VALSARTAN 160 MG/1
160 TABLET ORAL DAILY
Qty: 90 TAB | Refills: 3 | Status: CANCELLED | OUTPATIENT
Start: 2020-01-02

## 2020-01-02 NOTE — TELEPHONE ENCOUNTER
Pharmacist called in regards to looking for an alternative for Valsartan.  Please advise    [phone]

## 2020-01-06 ENCOUNTER — TELEPHONE (OUTPATIENT)
Dept: CARDIOLOGY CLINIC | Age: 75
End: 2020-01-06

## 2020-01-06 NOTE — TELEPHONE ENCOUNTER
LUISA for pt. Had discussed medication change with her in December 18, 2019, and she wanted to hold off for now. Will await r/t call.

## 2020-01-06 NOTE — TELEPHONE ENCOUNTER
Pharmacist called in regards to looking for an alternative for Valsartan.  Please advise     Phone:[phone]

## 2020-01-07 ENCOUNTER — TELEPHONE (OUTPATIENT)
Dept: CARDIOLOGY CLINIC | Age: 75
End: 2020-01-07

## 2020-01-07 RX ORDER — VALSARTAN 160 MG/1
160 TABLET ORAL DAILY
Qty: 90 TAB | Refills: 3 | Status: SHIPPED | OUTPATIENT
Start: 2020-01-07 | End: 2020-01-07

## 2020-01-07 RX ORDER — VALSARTAN 160 MG/1
160 TABLET ORAL DAILY
Qty: 90 TAB | Refills: 3 | Status: SHIPPED | OUTPATIENT
Start: 2020-01-07 | End: 2021-01-28

## 2020-01-07 NOTE — TELEPHONE ENCOUNTER
Spoke to pt, she has enough Valsartan for another week. She has had a bad reaction to Losartan in the past, and does not wish to change formulas at this time. She will try to call around to different pharmacies to see if they have Valsartan available & will let me know where to send the rx to.

## 2020-01-07 NOTE — TELEPHONE ENCOUNTER
Patient states that she found the losartin medication at this [facility] and was calling to give the fax to have the prescription sent to.  Thanks     Valerie Gupta fax Number: [phone]

## 2020-01-07 NOTE — TELEPHONE ENCOUNTER
Per Dr. Heather Cunningham VO:  EIF:4/20/7735  Future Appointments   Date Time Provider Osman Varghesei   4/16/2020  9:40 AM [facility]     Requested Prescriptions     Signed Prescriptions Disp Refills    valsartan (DIOVAN) 160 mg tablet 90 Tab 3     Sig: Take 1 Tab by mouth daily.      Authorizing Provider: Herminio Garrett     Ordering User: Yadiel Baum

## 2020-02-13 ENCOUNTER — HOSPITAL ENCOUNTER (OUTPATIENT)
Dept: MAMMOGRAPHY | Age: 75
Discharge: HOME | End: 2020-02-13
Attending: FAMILY MEDICINE
Payer: MEDICARE

## 2020-02-13 DIAGNOSIS — Z12.31 ENCOUNTER FOR MAMMOGRAM TO ESTABLISH BASELINE MAMMOGRAM: ICD-10-CM

## 2020-02-13 PROCEDURE — 77063 BREAST TOMOSYNTHESIS BI: CPT

## 2020-06-30 RX ORDER — HYDROCHLOROTHIAZIDE 12.5 MG/1
TABLET ORAL
Qty: 30 TAB | Refills: 0 | Status: SHIPPED | OUTPATIENT
Start: 2020-06-30 | End: 2020-07-30

## 2020-06-30 NOTE — TELEPHONE ENCOUNTER
Per Dr. Jag Horner VO:  MHE:9/45/0161  Future Appointments   Date Time Provider Osman Putnam   7/2/2020  9:20 AM Sara Child MD [facility]     Requested Prescriptions     Pending Prescriptions Disp Refills    hydroCHLOROthiazide (HYDRODIURIL) 12.5 mg tablet [Pharmacy Med Name: HYDROCHLOROTHIAZIDE 12.5 MG TB] 90 Tab 2     Sig: TAKE 1 TABLET BY MOUTH EVERY DAY

## 2020-07-02 ENCOUNTER — OFFICE VISIT (OUTPATIENT)
Dept: CARDIOLOGY CLINIC | Age: 75
End: 2020-07-02

## 2020-07-02 VITALS
DIASTOLIC BLOOD PRESSURE: 72 MMHG | HEART RATE: 80 BPM | OXYGEN SATURATION: 98 % | BODY MASS INDEX: 25.16 KG/M2 | HEIGHT: 65 IN | WEIGHT: 151 LBS | SYSTOLIC BLOOD PRESSURE: 126 MMHG

## 2020-07-02 DIAGNOSIS — Z82.49 FH ISCHEMIC HEART DISEASE: ICD-10-CM

## 2020-07-02 DIAGNOSIS — I10 ESSENTIAL HYPERTENSION: Primary | ICD-10-CM

## 2020-07-02 DIAGNOSIS — E78.5 HYPERLIPIDEMIA, UNSPECIFIED HYPERLIPIDEMIA TYPE: ICD-10-CM

## 2020-07-02 NOTE — PROGRESS NOTES
Chief Complaint   Patient presents with    Annual Exam    Hypertension    Cholesterol Problem     Visit Vitals  /72 (BP 1 Location: Left arm, BP Patient Position: Sitting)   Pulse 80   Ht 5' 5\" (1.651 m)   Wt 151 lb (68.5 kg)   SpO2 98%   BMI 25.13 kg/m²     Chest pain denied   SOB denied   Swelling in hands/feet denied   Dizziness denied   Recent hospital stays denied   Refills denied   118/75 this morning

## 2020-07-02 NOTE — PROGRESS NOTES
Yang Patrick MD. [facility] [place]              Patient: Salena Giordano  : 1945      Today's Date: 2020            HISTORY OF PRESENT ILLNESS:     History of Present Illness:  Here for follow-up. No CP or SOB. No dizziness. She walks most days the week. No complaints. BP looks good at home. PAST MEDICAL HISTORY:     Past Medical History:   Diagnosis Date    Borderline diabetes     Essential hypertension     FH ischemic heart disease     Hyperlipidemia        Past Surgical History:   Procedure Laterality Date    COLONOSCOPY N/A 5/15/2018    COLONOSCOPY performed by Gunnar Christiansen MD at [address] [place] [address]    HX HEENT      cataracts         MEDICATIONS:     Current Outpatient Medications   Medication Sig Dispense Refill    hydroCHLOROthiazide (HYDRODIURIL) 12.5 mg tablet TAKE 1 TABLET BY MOUTH EVERY DAY 30 Tab 0    valsartan (DIOVAN) 160 mg tablet Take 1 Tab by mouth daily. 90 Tab 3    amLODIPine (NORVASC) 10 mg tablet Take 1 Tab by mouth daily. 90 Tab 3    simvastatin (ZOCOR) 20 mg tablet Take  by mouth nightly.  meclizine (ANTIVERT) 25 mg tablet Take  by mouth as needed.  aspirin-acetaminophen-caffeine (EXCEDRIN ES) 250-250-65 mg per tablet Take 1 Tab by mouth as needed for Headache.  famotidine (PEPCID AC PO) Take  by mouth as needed.  calcium-cholecalciferol, d3, (CALCIUM 600 + D) 600-125 mg-unit tab Take  by mouth daily.  LORATADINE (ALAVERT PO) Take  by mouth.  multivitamins-minerals-lutein (MULTIVITAMIN 50 PLUS) tab tablet Take 1 Tab by mouth daily.  calcium carbonate-simethicone (MAGGI-SELTZER HEARTBURN+GAS) 750-80 mg chew Take  by mouth.          Allergies   Allergen Reactions    Losartan Other (comments)     Dizziness    Sulfa (Sulfonamide Antibiotics) Unknown (comments)           SOCIAL HISTORY:     Social History     Tobacco Use    Smoking status: Never Smoker    Smokeless tobacco: Never Used Substance Use Topics    Alcohol use: No    Drug use: No       FAMILY HISTORY:     Family History   Problem Relation Age of Onset    Diabetes Mother     Diabetes Father     Heart Attack Father     Heart Attack Brother           REVIEW OF SYMPTOMS:      Review of Symptoms:  Constitutional: Negative for fever, chills  HEENT: Negative for nosebleeds, tinnitus, and vision changes. Respiratory: Negative for cough, wheezing  Cardiovascular: Negative for orthopnea, claudication, syncope, and PND. Gastrointestinal: Negative for abdominal pain, diarrhea, melena. Genitourinary: Negative for dysuria  Musculoskeletal: Negative for myalgias. Skin: Negative for rash  Heme: No problems bleeding. Neurological: Negative for speech change and focal weakness.                PHYSICAL EXAM:     Physical Exam:  Visit Vitals  /72 (BP 1 Location: Left arm, BP Patient Position: Sitting)   Pulse 80   Ht 5' 5\" (1.651 m)   Wt 151 lb (68.5 kg)   LMP  (LMP Unknown)   SpO2 98%   BMI 25.13 kg/m²     Patient appears generally well, mood and affect are appropriate and pleasant. HEENT:  Hearing intact, non-icteric, normocephalic, atraumatic. Neck Exam: Supple, No JVD or carotid bruits. Lung Exam: Clear to auscultation, even breath sounds. Cardiac Exam: Regular rate and rhythm with no murmur or rub  Abdomen: Soft, non-tender, normal bowel sounds. No bruits or masses. Extremities: Moves all ext well. No lower extremity edema. Vascular: 2+ dorsalis pedis pulses bilaterally. Psych: Appropriate affect  Neuro - Grossly intact          LABS / OTHER STUDIES:      Labs 1/11/19 - chol 167, HDL 63, LDL 84, , A1c 6.1, CBC OK, CMP OK, TSH 2,            CARDIAC DIAGNOSTICS:      Cardiac Evaluation Includes:     Stress Echo 9/30/15 - walked 9:15 (10.9 METs).   There was mild chest discomfort at rest, but this did not get any worse with exercise.  Maximal systolic blood pressure during stress was 170 mmHg.  The stress ECG was negative for ischemia.  Normal Stress echo.   Additional echo findings - normal RV size and function. Mild MR and Trivial TR. Normal AV velocity. Normal atrial sizes. Insufficient TR for RVSP.     EKG 3/1/19 - NSR, normal   EKG 7/2/20 - NSR, normal         ASSESSMENT AND PLAN:      Assessment and Plan:  1) HTN  - BP looks good on current regimen which we'll continue.      2) CV Risk Management (FH of heart disease)  - work on healthy diet and exercise  - on a statin - lipids followed by PCP   - Discussed a CT heart scan (I don't think she needs it but an option if she is interested which she is not)      3) See me back in one year (her preference to keep FU's).  Patient expressed understanding of the plan - questions were answered.       Daughter is Magnet Nurse at [employer] is [address] [place] [address] has Colon Cancer.         Ajay Mcclelland MD, [address] [place], [address]      [id number] [address].  [address]  [address]  Ph: [phone]                                [phone]

## 2020-07-30 RX ORDER — HYDROCHLOROTHIAZIDE 12.5 MG/1
TABLET ORAL
Qty: 90 TAB | Refills: 3 | Status: SHIPPED | OUTPATIENT
Start: 2020-07-30 | End: 2021-05-17

## 2020-07-30 NOTE — TELEPHONE ENCOUNTER
Per Dr. Avel Rios VO:  BKF:9/3/9209  Future Appointments   Date Time Provider Osman Varghesei   7/9/2021  9:20 AM Yaz Ashton MD [facility]     Requested Prescriptions     Pending Prescriptions Disp Refills    hydroCHLOROthiazide (HYDRODIURIL) 12.5 mg tablet [Pharmacy Med Name: HYDROCHLOROTHIAZIDE 12.5 MG TB] 30 Tab 0     Sig: TAKE 1 TABLET BY MOUTH EVERY DAY

## 2021-01-28 RX ORDER — VALSARTAN 160 MG/1
TABLET ORAL
Qty: 90 TAB | Refills: 1 | Status: SHIPPED | OUTPATIENT
Start: 2021-01-28 | End: 2021-09-07

## 2021-01-28 NOTE — TELEPHONE ENCOUNTER
Refill per VO of Dr. Aurora Pacheco:  Last appt: 7/2/2020  Future Appointments   Date Time Provider Osman Indy   7/9/2021  9:20 AM Flavio Boyce MD [facility]       Requested Prescriptions     Pending Prescriptions Disp Refills    valsartan (DIOVAN) 160 mg tablet [Pharmacy Med Name: VALSARTAN 160 MG TABLET] 90 Tab 3     Sig: TAKE 1 TABLET BY MOUTH EVERY DAY

## 2021-02-05 ENCOUNTER — TRANSCRIBE ORDER (OUTPATIENT)
Dept: SCHEDULING | Age: 76
End: 2021-02-05

## 2021-02-05 DIAGNOSIS — Z12.31 VISIT FOR SCREENING MAMMOGRAM: Primary | ICD-10-CM

## 2021-02-23 ENCOUNTER — HOSPITAL ENCOUNTER (OUTPATIENT)
Dept: MAMMOGRAPHY | Age: 76
Discharge: HOME | End: 2021-02-23
Attending: FAMILY MEDICINE
Payer: COMMERCIAL

## 2021-02-23 DIAGNOSIS — Z12.31 VISIT FOR SCREENING MAMMOGRAM: ICD-10-CM

## 2021-02-23 PROCEDURE — 77063 BREAST TOMOSYNTHESIS BI: CPT

## 2021-05-17 RX ORDER — HYDROCHLOROTHIAZIDE 12.5 MG/1
TABLET ORAL
Qty: 90 TAB | Refills: 0 | Status: SHIPPED | OUTPATIENT
Start: 2021-05-17 | End: 2021-10-06

## 2021-05-17 NOTE — TELEPHONE ENCOUNTER
Per Dr. Danica Thomas VO:  ZOR:7/2/9678  Future Appointments   Date Time Provider Osman Varghesei   7/9/2021  9:20 AM Rafita Jang MD [facility]     Requested Prescriptions     Pending Prescriptions Disp Refills    hydroCHLOROthiazide (HYDRODIURIL) 12.5 mg tablet [Pharmacy Med Name: HYDROCHLOROTHIAZIDE 12.5 MG TB] 90 Tab 3     Sig: TAKE 1 TABLET BY MOUTH EVERY DAY

## 2021-07-02 ENCOUNTER — OFFICE VISIT (OUTPATIENT)
Dept: CARDIOLOGY CLINIC | Age: 76
End: 2021-07-02
Payer: COMMERCIAL

## 2021-07-02 VITALS
OXYGEN SATURATION: 98 % | SYSTOLIC BLOOD PRESSURE: 126 MMHG | HEIGHT: 65 IN | DIASTOLIC BLOOD PRESSURE: 72 MMHG | BODY MASS INDEX: 25.13 KG/M2 | HEART RATE: 76 BPM

## 2021-07-02 DIAGNOSIS — Z82.49 FH ISCHEMIC HEART DISEASE: ICD-10-CM

## 2021-07-02 DIAGNOSIS — E78.5 HYPERLIPIDEMIA, UNSPECIFIED HYPERLIPIDEMIA TYPE: ICD-10-CM

## 2021-07-02 DIAGNOSIS — I10 ESSENTIAL HYPERTENSION: Primary | ICD-10-CM

## 2021-07-02 PROCEDURE — 93000 ELECTROCARDIOGRAM COMPLETE: CPT | Performed by: SPECIALIST

## 2021-07-02 PROCEDURE — 99214 OFFICE O/P EST MOD 30 MIN: CPT | Performed by: SPECIALIST

## 2021-07-02 NOTE — PROGRESS NOTES
Sridevi Jordan MD. [facility]              Patient: Micaela Salas  : 1945      Today's Date: 2021          HISTORY OF PRESENT ILLNESS:     History of Present Illness:  Here for FU. She feels fine. Walks when she can - tries 5k steps daily at least.  No cardiac complaints. Some PEDRAZA climbing hills, but otherwise OK. PAST MEDICAL HISTORY:     Past Medical History:   Diagnosis Date    Borderline diabetes     Essential hypertension     FH ischemic heart disease     Hyperlipidemia          Past Surgical History:   Procedure Laterality Date    COLONOSCOPY N/A 5/15/2018    COLONOSCOPY performed by Clarissa Vizcarra MD at [address] [place] [address]    HX HEENT      cataracts         MEDICATIONS:     Current Outpatient Medications   Medication Sig Dispense Refill    hydroCHLOROthiazide (HYDRODIURIL) 12.5 mg tablet TAKE 1 TABLET BY MOUTH EVERY DAY 90 Tab 0    valsartan (DIOVAN) 160 mg tablet TAKE 1 TABLET BY MOUTH EVERY DAY 90 Tab 1    amLODIPine (NORVASC) 10 mg tablet Take 1 Tab by mouth daily. 90 Tab 3    simvastatin (ZOCOR) 20 mg tablet Take  by mouth nightly.  meclizine (ANTIVERT) 25 mg tablet Take  by mouth as needed.  aspirin-acetaminophen-caffeine (EXCEDRIN ES) 250-250-65 mg per tablet Take 1 Tab by mouth as needed for Headache.  famotidine (PEPCID AC PO) Take  by mouth as needed.  calcium-cholecalciferol, d3, (CALCIUM 600 + D) 600-125 mg-unit tab Take  by mouth daily.  LORATADINE (ALAVERT PO) Take  by mouth.  multivitamins-minerals-lutein (MULTIVITAMIN 50 PLUS) tab tablet Take 1 Tab by mouth daily.  calcium carbonate-simethicone (MAGGI-SELTZER HEARTBURN+GAS) 750-80 mg chew Take  by mouth.          Allergies   Allergen Reactions    Losartan Other (comments)     Dizziness    Sulfa (Sulfonamide Antibiotics) Unknown (comments)             SOCIAL HISTORY:     Social History     Tobacco Use    Smoking status: Never Smoker    Smokeless tobacco: Never Used   Substance Use Topics    Alcohol use: No    Drug use: No         FAMILY HISTORY:     Family History   Problem Relation Age of Onset    Diabetes Mother     Diabetes Father     Heart Attack Father     Heart Attack Brother              REVIEW OF SYMPTOMS:     Review of Symptoms:  Constitutional: Negative for fever, chills  HEENT: Negative for nosebleeds, tinnitus, and vision changes. Respiratory: Negative for cough, wheezing  Cardiovascular: Negative for orthopnea, claudication, syncope, and PND. Gastrointestinal: Negative for abdominal pain, diarrhea, melena. Genitourinary: Negative for dysuria  Musculoskeletal: Negative for myalgias. Skin: Negative for rash  Heme: No problems bleeding. Neurological: Negative for speech change and focal weakness. PHYSICAL EXAM:     Physical Exam:  Visit Vitals  /72 (BP 1 Location: Left upper arm, BP Patient Position: Sitting)   Pulse 76   Ht 5' 5\" (1.651 m)   LMP  (LMP Unknown)   SpO2 98%   BMI 25.13 kg/m²     Patient appears generally well, mood and affect are appropriate and pleasant. HEENT:  Hearing intact, non-icteric, normocephalic, atraumatic. Neck Exam: Supple, No JVD or carotid bruits. Lung Exam: Clear to auscultation, even breath sounds. Cardiac Exam: Regular rate and rhythm with no murmur or rub  Abdomen: Soft, non-tender, normal bowel sounds. No bruits or masses. Extremities: Moves all ext well. No lower extremity edema. MSKTL: Overall good ROM ext  Skin: No significant rashes  Vascular: 2+ dorsalis pedis pulses bilaterally.   Psych: Appropriate affect  Neuro - Grossly intact        LABS / OTHER STUDIES reviewed:       Lab Results   Component Value Date/Time    Sodium 137 04/09/2019 09:05 AM    Potassium 4.6 04/09/2019 09:05 AM    Chloride 96 04/09/2019 09:05 AM    CO2 27 04/09/2019 09:05 AM    Anion gap 6 05/07/2016 04:15 PM    Glucose 126 (H) 04/09/2019 09:05 AM    BUN 17 04/09/2019 09:05 AM Creatinine 0.71 04/09/2019 09:05 AM    BUN/Creatinine ratio 24 04/09/2019 09:05 AM    GFR est AA 96 04/09/2019 09:05 AM    GFR est non-AA 84 04/09/2019 09:05 AM    Calcium 9.7 04/09/2019 09:05 AM       Lab Results   Component Value Date/Time    WBC 6.5 05/07/2016 04:15 PM    HGB 12.7 05/07/2016 04:15 PM    HCT 36.8 05/07/2016 04:15 PM    PLATELET 558 54/22/1602 04:15 PM    MCV 94.8 05/07/2016 04:15 PM     Labs 1/11/19 - chol 167, HDL 63, LDL 84, , A1c 6.1, CBC OK, CMP OK, TSH 2,  Labs 9/20 -  LDL-P 1063, HDL 65, chol 172, TG 97, CMP OK   Labs 3/12/21 - CBC OK, A1c 5.8,        CARDIAC DIAGNOSTICS:     Cardiac Evaluation Includes:  I reviewed the results below. Stress Echo 9/30/15 - walked 9:15 (10.9 METs).   There was mild chest discomfort at rest, but this did not get any worse with exercise. Maximal systolic blood pressure during stress was 170 mmHg.  The stress ECG was negative for ischemia.  Normal Stress echo.   Additional echo findings - normal RV size and function. Mild MR and Trivial TR. Normal AV velocity. Normal atrial sizes. Insufficient TR for RVSP.     EKG 3/1/19 - NSR, normal   EKG 7/2/20 - NSR, normal   EKG 7/2/21 - NSR, non-specific T wave abn         ASSESSMENT AND PLAN:     Assessment and Plan:    1) HTN  - BP looks good on current regimen which we'll continue.      2) CV Risk Management (FH of heart disease)  - work on healthy diet and exercise  - on a statin - lipids followed by PCP   - she denies cardiac complaints     3) Dyslipidemia   - cont statin - recent lipids OK      3) See me back in one year (her preference to keep FU's).  Patient expressed understanding of the plan - questions were answered.       Daughter is Magnet Nurse at [employer] is .   Has great grand children (3).   has metastatic Colon Cancer.            Mayo Vaz MD, [facility] [address] [address], [address]  [place] [address]  [address], [address] [place] [address]  Ph: [phone]   Ph [phone]

## 2021-07-02 NOTE — PROGRESS NOTES
Room 6    Visit Vitals  /72 (BP 1 Location: Left upper arm, BP Patient Position: Sitting)   Pulse 76   Ht 5' 5\" (1.651 m)   LMP  (LMP Unknown)   SpO2 98%   BMI 25.13 kg/m²       Decline to weigh    Chest pain: no  Shortness of breath: no  Edema: no  Palpitations: no  Dizziness: no    New diagnosis/Surgeries: no    ER/Hospitalizations: no    Refills: no

## 2021-07-13 ENCOUNTER — TELEPHONE (OUTPATIENT)
Dept: CARDIOLOGY CLINIC | Age: 76
End: 2021-07-13

## 2021-07-13 NOTE — TELEPHONE ENCOUNTER
Called pt,  Let her know that the monitor was ordered in error. She was understanding and stated she did not wear it at all. She will send it back to the monitor company.

## 2021-07-13 NOTE — TELEPHONE ENCOUNTER
Patient received a loop monitor mailed to her residence and states she doesn't know what why it was sent and it was not told to her that she is to wear this monitor on her last office visit on 7/02/21, patient states she cannot not wear it at this time due to going on vacation in a week and also towards the end of the summer, patient would like to know why she needs this monitor, please advise        [phone]

## 2021-09-07 RX ORDER — VALSARTAN 160 MG/1
TABLET ORAL
Qty: 90 TABLET | Refills: 3 | Status: SHIPPED | OUTPATIENT
Start: 2021-09-07 | End: 2022-09-07

## 2021-09-07 NOTE — TELEPHONE ENCOUNTER
Refill per VO of Dr. Mayo Vaz  Last appt: 7/2/2020  Future Appointments   Date Time Provider Osman [place]   7/7/2022 10:20 AM Connie Fraser MD [facility]       Requested Prescriptions     Pending Prescriptions Disp Refills    valsartan (DIOVAN) 160 mg tablet [Pharmacy Med Name: VALSARTAN 160 MG TABLET] 90 Tablet 1     Sig: TAKE 1 TABLET BY MOUTH EVERY DAY

## 2021-10-06 RX ORDER — HYDROCHLOROTHIAZIDE 12.5 MG/1
TABLET ORAL
Qty: 90 TABLET | Refills: 3 | Status: SHIPPED | OUTPATIENT
Start: 2021-10-06 | End: 2022-09-26

## 2021-10-06 NOTE — TELEPHONE ENCOUNTER
Refill per VO of Dr. Capri Lau  Last appt: 7/2/2020  Future Appointments   Date Time Provider Osman Putnam   7/7/2022 10:20 AM Alexey Choe MD [facility]       Requested Prescriptions     Pending Prescriptions Disp Refills    hydroCHLOROthiazide (HYDRODIURIL) 12.5 mg tablet [Pharmacy Med Name: HYDROCHLOROTHIAZIDE 12.5 MG TB] 90 Tablet 0     Sig: TAKE 1 TABLET BY MOUTH EVERY DAY

## 2022-01-17 ENCOUNTER — TRANSCRIBE ORDER (OUTPATIENT)
Dept: SCHEDULING | Age: 77
End: 2022-01-17

## 2022-01-17 DIAGNOSIS — Z12.31 SCREENING MAMMOGRAM FOR HIGH-RISK PATIENT: Primary | ICD-10-CM

## 2022-02-25 ENCOUNTER — HOSPITAL ENCOUNTER (OUTPATIENT)
Dept: MAMMOGRAPHY | Age: 77
Discharge: HOME | End: 2022-02-25
Attending: FAMILY MEDICINE
Payer: COMMERCIAL

## 2022-02-25 DIAGNOSIS — Z12.31 SCREENING MAMMOGRAM FOR HIGH-RISK PATIENT: ICD-10-CM

## 2022-02-25 PROCEDURE — 77063 BREAST TOMOSYNTHESIS BI: CPT

## 2022-04-12 NOTE — ROUTINE PROCESS
History and Physical      Name:  Elisa Rios /Age/Sex: 1937  (79 y.o. female)   MRN & CSN:  [id number] & [id number] Admission Date/Time: 2022  6:30 PM   Location:   PCP: Robbie Lewis MD       Hospital Day: 1    Assessment and Plan:   Elisa Rios is a 79 y.o.  female  who presents with Sepsis ([address]    # Sepsis  Likely source lungs and urine   Criteria: WBC 17.0, Temp 102.7 F, RR 32, Pulse 117,    Lactic acid 3.7,, Hypoxia (80 % oxygen saturation in room air)    Altered mental status   fatigue, generalized body weakness, shortness of breath   Fluid (30ml/kg,) telemetry, Oxygen supplement , culture,    Rocephin/Azithro. GI and DVT prophylaxis     # Acute  Hypoxic respiratory failure    80% oxygen saturation in room air    Doses not use oxygen at home before now    On 2l Oxygen for saturation of 97%   wean as tolerated     # Pneumonia     Chest X-ray Impression:  Increased interstitial opacity, with more focal opacities at the lung bases. In this case, interstitial edema and multifocal pneumonia are both considered     Fever, fatigue, vomiting shortness of breath, leucocytosis Culture, oxygen supplement     Strep pneumo,  Legionella. Telemetry, hydration, Rocephin     # Hyponatremia     Na 133     Replace and check am lab for trend.     # Dementia    Patient on namenda and Exelon    # Afib(not in exacerbation    anticoagulated with Eliquis, rate control with lopressor    [id number] Score for Atrial Fibrillation Stroke Risk   Risk   Factors  Component Value   C CHF No 0   H HTN No 0   A2 Age >= 76 Yes,  (79 y.o.) 2   D DM No 0   S2 Prior Stroke/TIA No 0   V Vascular Disease No 0   A Age 74-69 No,  (79 y.o.) 0   Sc Sex female 1    [id number]  Score  3   Score last updated 22 46:91 PM EDT    Click here for a link to the UpToDate guideline \"Atrial Fibrillation: Anticoagulation therapy to prevent embolization    Disclaimer: Risk Score calculation is dependent on accuracy of patient [place]  1945  [id number]    Situation:  Verbal report received from: Jazmine Reeves RN  Procedure: Procedure(s):  COLONOSCOPY  ENDOSCOPIC POLYPECTOMY    Background:    Preoperative diagnosis: SCREENING  Postoperative diagnosis: Ascending Colon Polyp  Hemorrhoids    :  Dr. Rimma Lara  Assistant(s): Endoscopy Technician-1: Yola Sears  Endoscopy RN-1: Vernon Gauthier RN    Specimens:   ID Type Source Tests Collected by Time Destination   1 : Ascending Colon Polyp Preservative   Za Ayoub MD 5/15/2018 6909 Pathology     H. Pylori  no    Assessment:  Intra-procedure medications     Anesthesia gave intra-procedure sedation and medications, see anesthesia flow sheet yes    Intravenous fluids: NS@ KVO     Vital signs stable     Abdominal assessment: round and soft     Recommendation:  Discharge patient per MD order  Family or Friend   Permission to share finding with family or friend yes    Endoscopy discharge instructions have been reviewed and given to patient and spouse. The patient and spouse verbalized understanding and acceptance of instructions. problem list and past encounter diagnosis. # Hypothyroidism    Continue home synthroid     # Hyperlipidemia     On simvastatin at home     . Diet No diet orders on file   DVT Prophylaxis [] Lovenox, []  Heparin, [] SCDs, [] Ambulation, Eliquis   GI Prophylaxis [x] PPI,  [] H2 Blocker,  [] Carafate,  [] Diet/Tube Feeds   Code Status Prior   Disposition Patient requires continued admission due to  Sepsis   MDM [] Low, [] Moderate,[x]  High  Patient's risk as above due to   Hypoxic respiratory failure      History of Present Illness:     Chief Complaint: Sepsis ([address]       Jenise Bill is a 79 y.o.  female  Whose medical history include, hypothyroidism, Afib, hyperlipidemia, back pain, hard of hearing, TIA, Dementia, and pneumonia. Information obtained from ED provider and EMS staff ans patient was altered at time of examination. She was brought from an independent living facility bt EMS. She was seen last Friday by her PCP. Was diagnosed with UTI and placed on Macrobid. Her symptoms did not improve. She came in today with complaint of  Fever, nausea vomiting, fatigue , generalized body weakness and altered mentation. At the ED patient was observed to have hypoxic respiratory failure, having leucocytosis and x-Ray indicative of pneumonia. She is been admitted for further evaluation and management of sepsis, and associated  Infection. Ten point ROS unable to obtain due to her altered mentation. Objective: Intake/Output Summary (Last 24 hours) at 4/11/2022 2117  Last data filed at 4/11/2022 1902  Gross per 24 hour   Intake --   Output 100 ml   Net -100 ml      Vitals:   Vitals:    04/11/22 1815   BP:    Pulse:    Resp:    Temp: 102.7 °F (39.3 °C)   SpO2:      Physical Exam:     GEN Awake female,  Altered. Laying  in bed in apparent distress ill appearing . Appears given age. EYES Pupils are equally round. No scleral erythema, discharge, or conjunctivitis.   HENT Mucous membranes are moist. Oral pharynx without exudates, no evidence of thrush. NECK Supple, no apparent thyromegaly or masses. RESP Clear to auscultation, Tachypnic. no wheezes,  Appreciated i. Symmetric chest movement while on  2L oxygen . CARDIO/VASC S1/S2 auscultated. Tachycardic rate without appreciable murmurs, rubs, or gallops. No JVD or carotid bruits. Peripheral pulses equal bilaterally and palpable. No peripheral edema. GI Abdomen is soft  Non distended without significant tenderness, masses, or guarding. Bowel sounds are normoactive. Rectal exam deferred.  No costovertebral angle tenderness. Normal appearing external genitalia. Marshall catheter is not present. HEME/LYMPH No palpable cervical lymphadenopathy and no hepatosplenomegaly. No petechiae or ecchymoses. MSK  Right ankle pain  From recent fall at home (03/29/2022) No gross joint deformities. SKIN Normal coloration, warm, dry. NEURO Cranial nerves appear grossly intact, normal speech, no lateralizing weakness. PSYCH Awake, altered , oriented x 1. Affect appropriate. Past Medical History:      Past Medical History:   Diagnosis Date    Acquired hypothyroidism 12/9/2019    Patient is taking Synthroid    Chronic midline low back pain without sciatica 11/20/2019    Dementia without behavioral disturbance ([address] 10/22/2019    Patient has dementia. She is taking Namenda Patient is seeing urologist Dr. Boyd Abel Gait disturbance 10/22/2019    Patient uses walker to ambulate    History of TIA (transient ischemic attack) 12/9/2019    Patient has a history of TIA and also recently may have had TIA. Patient is on Eliquis. Sees neurologist Dr. Boyd Abel Major depressive disorder with single episode, in full remission ([address] 12/9/2019    Patient is taking Zoloft and that is helping.  Mixed hyperlipidemia 12/9/2019    Patient is on simvastatin    Persistent atrial fibrillation ([address] 10/22/2019    A. fib on recently in October 2019 .   Patient is on metoprolol 25 mg twice a day and Eliquis    PFO (patent foramen ovale) 12/9/2019    PFO seen on echocardiogram in 2016    Pneumonia due to infectious organism 10/22/2019    Patient had pneumonia and pleural effusion during admission in October 2019. A repeat CT scan of the chest will be done Patient is also seen pulmonologist     PSHX:  has a past surgical history that includes Hysterectomy. Allergies: No Known Allergies    FAM HX: family history includes Alcohol Abuse in her mother; No Known Problems in her father; Obesity in her mother. Soc HX:   Social History     Socioeconomic History    Marital status:      Spouse name: None    Number of children: None    Years of education: None    Highest education level: None   Occupational History    None   Tobacco Use    Smoking status: Never Smoker    Smokeless tobacco: Never Used   Vaping Use    Vaping Use: Never used   Substance and Sexual Activity    Alcohol use: No    Drug use: No    Sexual activity: None   Other Topics Concern    None   Social History Narrative    None     Social Determinants of Health     Financial Resource Strain: Low Risk     Difficulty of Paying Living Expenses: Not hard at all   Food Insecurity: No Food Insecurity    Worried About Running Out of Food in the Last Year: Never true    [place] of Food in the Last Year: Never true   Transportation Needs:     Lack of Transportation (Medical): Not on file    Lack of Transportation (Non-Medical):  Not on file   Physical Activity: Insufficiently Active    Days of Exercise per Week: 7 days    Minutes of Exercise per Session: 20 min   Stress:     Feeling of Stress : Not on file   Social Connections:     Frequency of Communication with Friends and Family: Not on file    Frequency of Social Gatherings with Friends and Family: Not on file    Attends Pent[employer]al Services: Not on file    Active Member of Clubs or Organizations: Not on file    Attends Club or Organization Meetings: Not on file    Marital Status: Not on file   Intimate Partner Violence:     Fear of Current or Ex-Partner: Not on file    Emotionally Abused: Not on file    Physically Abused: Not on file    Sexually Abused: Not on file   Housing Stability:     Unable to Pay for Housing in the Last Year: Not on file    Number of [place] in the Last Year: Not on file    Unstable Housing in the Last Year: Not on file       Medications:   Medications:    cefTRIAXone (ROCEPHIN) IV  1,000 mg IntraVENous Q24H    azithromycin  250 mg IntraVENous Q24H      Infusions:    sodium chloride 1,000 mL (04/11/22 2030)     PRN Meds:        Electronically signed by NGOC Cox CNP on 4/11/2022 at 9:17 PM

## 2022-07-06 ENCOUNTER — OFFICE VISIT (OUTPATIENT)
Dept: CARDIOLOGY CLINIC | Age: 77
End: 2022-07-06
Payer: MEDICARE

## 2022-07-06 VITALS
SYSTOLIC BLOOD PRESSURE: 122 MMHG | WEIGHT: 147 LBS | HEART RATE: 72 BPM | DIASTOLIC BLOOD PRESSURE: 82 MMHG | OXYGEN SATURATION: 98 % | BODY MASS INDEX: 24.49 KG/M2 | HEIGHT: 65 IN

## 2022-07-06 DIAGNOSIS — I10 ESSENTIAL HYPERTENSION: Primary | ICD-10-CM

## 2022-07-06 DIAGNOSIS — E78.5 HYPERLIPIDEMIA, UNSPECIFIED HYPERLIPIDEMIA TYPE: ICD-10-CM

## 2022-07-06 PROCEDURE — G0463 HOSPITAL OUTPT CLINIC VISIT: HCPCS | Performed by: SPECIALIST

## 2022-07-06 PROCEDURE — 1123F ACP DISCUSS/DSCN MKR DOCD: CPT | Performed by: SPECIALIST

## 2022-07-06 PROCEDURE — 93010 ELECTROCARDIOGRAM REPORT: CPT | Performed by: SPECIALIST

## 2022-07-06 PROCEDURE — 99214 OFFICE O/P EST MOD 30 MIN: CPT | Performed by: SPECIALIST

## 2022-07-06 PROCEDURE — 93005 ELECTROCARDIOGRAM TRACING: CPT | Performed by: SPECIALIST

## 2022-07-06 NOTE — PROGRESS NOTES
Mali Marks MD. [address] [facility]              Patient: Al Colon  : 1945      Today's Date: 2022          HISTORY OF PRESENT ILLNESS:     History of Present Illness:  Here for FU. She feels fine. Walks when she can - tries 5k steps daily at least.  No cardiac complaints. Some PEDRAZA climbing hills, but otherwise OK.  passed away in 2021. PAST MEDICAL HISTORY:     Past Medical History:   Diagnosis Date    Borderline diabetes     Essential hypertension     FH ischemic heart disease     Hyperlipidemia          Past Surgical History:   Procedure Laterality Date    COLONOSCOPY N/A 5/15/2018    COLONOSCOPY performed by Asia Hunt MD at [address] [place] [address]    HX HEENT      cataracts         MEDICATIONS:     Current Outpatient Medications   Medication Sig Dispense Refill    hydroCHLOROthiazide (HYDRODIURIL) 12.5 mg tablet TAKE 1 TABLET BY MOUTH EVERY DAY 90 Tablet 3    valsartan (DIOVAN) 160 mg tablet TAKE 1 TABLET BY MOUTH EVERY DAY 90 Tablet 3    amLODIPine (NORVASC) 10 mg tablet Take 1 Tab by mouth daily. 90 Tab 3    simvastatin (ZOCOR) 20 mg tablet Take  by mouth nightly.  meclizine (ANTIVERT) 25 mg tablet Take  by mouth as needed.  aspirin-acetaminophen-caffeine (EXCEDRIN ES) 250-250-65 mg per tablet Take 1 Tab by mouth as needed for Headache.  famotidine (PEPCID AC PO) Take  by mouth as needed.  calcium-cholecalciferol, d3, (CALCIUM 600 + D) 600-125 mg-unit tab Take  by mouth daily.  LORATADINE (ALAVERT PO) Take  by mouth.  multivitamins-minerals-lutein (MULTIVITAMIN 50 PLUS) tab tablet Take 1 Tab by mouth daily.  calcium carbonate-simethicone (MAGGI-SELTZER HEARTBURN+GAS) 750-80 mg chew Take  by mouth.          Allergies   Allergen Reactions    Losartan Other (comments)     Dizziness    Sulfa (Sulfonamide Antibiotics) Unknown (comments)             SOCIAL HISTORY:     Social History     Tobacco Use    Smoking status: Never Smoker    Smokeless tobacco: Never Used   Substance Use Topics    Alcohol use: No    Drug use: No         FAMILY HISTORY:     Family History   Problem Relation Age of Onset    Diabetes Mother     Diabetes Father     Heart Attack Father     Heart Attack Brother              REVIEW OF SYMPTOMS:     Review of Symptoms:  Constitutional: Negative for fever, chills  HEENT: Negative for nosebleeds, tinnitus, and vision changes. Respiratory: Negative for cough, wheezing  Cardiovascular: Negative for orthopnea, claudication, syncope, and PND. Gastrointestinal: Negative for abdominal pain, diarrhea, melena. Genitourinary: Negative for dysuria  Musculoskeletal: Negative for myalgias. Skin: Negative for rash  Heme: No problems bleeding. Neurological: Negative for speech change and focal weakness. PHYSICAL EXAM:     Physical Exam:  Visit Vitals  /82 (BP 1 Location: Left upper arm, BP Patient Position: Sitting)   Pulse 72   Ht 5' 5\" (1.651 m)   Wt 147 lb (66.7 kg)   LMP  (LMP Unknown)   SpO2 98%   BMI 24.46 kg/m²     Patient appears generally well, mood and affect are appropriate and pleasant. HEENT:  Hearing intact, non-icteric, normocephalic, atraumatic. Neck Exam: Supple, No JVD    Lung Exam: Clear to auscultation, even breath sounds. Cardiac Exam: Regular rate and rhythm with no murmur or rub  Abdomen: Soft, non-tender, normal bowel sounds. Extremities: Moves all ext well. No lower extremity edema.   MSKTL: Overall good ROM ext  Skin: No significant rashes  Psych: Appropriate affect  Neuro - Grossly intact        LABS / OTHER STUDIES reviewed:       Lab Results   Component Value Date/Time    Sodium 137 04/09/2019 09:05 AM    Potassium 4.6 04/09/2019 09:05 AM    Chloride 96 04/09/2019 09:05 AM    CO2 27 04/09/2019 09:05 AM    Anion gap 6 05/07/2016 04:15 PM    Glucose 126 (H) 04/09/2019 09:05 AM    BUN 17 04/09/2019 09:05 AM    Creatinine 0.71 04/09/2019 09:05 AM    BUN/Creatinine ratio 24 04/09/2019 09:05 AM    GFR est AA 96 04/09/2019 09:05 AM    GFR est non-AA 84 04/09/2019 09:05 AM    Calcium 9.7 04/09/2019 09:05 AM       Lab Results   Component Value Date/Time    WBC 6.5 05/07/2016 04:15 PM    HGB 12.7 05/07/2016 04:15 PM    HCT 36.8 05/07/2016 04:15 PM    PLATELET 115 93/58/8990 04:15 PM    MCV 94.8 05/07/2016 04:15 PM     Labs 1/11/19 - chol 167, HDL 63, LDL 84, , A1c 6.1, CBC OK, CMP OK, TSH 2,  Labs 9/20 -  LDL-P 1063, HDL 65, chol 172, TG 97, CMP OK   Labs 3/12/21 - CBC OK, A1c 5.8,        CARDIAC DIAGNOSTICS:     Cardiac Evaluation Includes:  I reviewed the results below. Stress Echo 9/30/15 - walked 9:15 (10.9 METs).   There was mild chest discomfort at rest, but this did not get any worse with exercise. Maximal systolic blood pressure during stress was 170 mmHg.  The stress ECG was negative for ischemia.  Normal Stress echo.   Additional echo findings - normal RV size and function. Mild MR and Trivial TR. Normal AV velocity. Normal atrial sizes. Insufficient TR for RVSP.     EKG 3/1/19 - NSR, normal   EKG 7/2/20 - NSR, normal   EKG 7/2/21 - NSR, non-specific T wave abn   EKG 7/6/22 - NSR, normal         ASSESSMENT AND PLAN:     Assessment and Plan:    1) HTN  - BP looks good on current regimen which we'll continue.      2) CV Risk Management (FH of heart disease)  - work on healthy diet and exercise  - on a statin - lipids followed by PCP   - she denies cardiac complaints     3) Dyslipidemia   - cont statin - recent lipids OK      3) See me back in one year (her preference to keep FU's). Daughter is Magnet Nurse at [employer] is . Has great grand children (2).   passed in November 2021 (had metastatic Colon Cancer).            Rafita Mcmanus MD, [address], [address]  [address], [address] [place] [address]  Ph: [phone]    [phone]

## 2022-07-06 NOTE — PROGRESS NOTES
Chief Complaint   Patient presents with    Follow-up     Annual     Hypertension    Cholesterol Problem     Visit Vitals  /82 (BP 1 Location: Left upper arm, BP Patient Position: Sitting)   Pulse 72   Ht 5' 5\" (1.651 m)   Wt 147 lb (66.7 kg)   SpO2 98%   BMI 24.46 kg/m²     Chest pain denied   SOB denied   Palpitations denied   Swelling in hands/feet denied   Dizziness denied   Recent hospital stays denied   Refills denied

## 2022-09-07 RX ORDER — VALSARTAN 160 MG/1
TABLET ORAL
Qty: 90 TABLET | Refills: 3 | Status: SHIPPED | OUTPATIENT
Start: 2022-09-07

## 2022-09-07 NOTE — TELEPHONE ENCOUNTER
Refill per VO of Dr. Agueda Almendarez  Last appt: 7/6/22  Future Appointments   Date Time Provider Osman [place]   7/6/2023  8:40 AM Lazarus Points, MD [facility] AMB       Requested Prescriptions     Signed Prescriptions Disp Refills    valsartan (DIOVAN) 160 mg tablet 90 Tablet 3     Sig: TAKE 1 TABLET BY MOUTH EVERY DAY     Authorizing Provider: Frantz Schrader     Ordering User: Terrell Price

## 2022-09-26 RX ORDER — HYDROCHLOROTHIAZIDE 12.5 MG/1
TABLET ORAL
Qty: 90 TABLET | Refills: 3 | Status: SHIPPED | OUTPATIENT
Start: 2022-09-26

## 2022-09-26 NOTE — TELEPHONE ENCOUNTER
Refill per VO of Dr. Leslee Zamora  Last appt: 7/6/22  Future Appointments   Date Time Provider [place]   7/6/2023  8:40 AM Kervin Moore MD [facility] AMB       Requested Prescriptions     Signed Prescriptions Disp Refills    hydroCHLOROthiazide (HYDRODIURIL) 12.5 mg tablet 90 Tablet 3     Sig: TAKE 1 TABLET BY MOUTH EVERY DAY     Authorizing Provider: Michael Santos     Ordering User: Ava Carlisle

## 2022-11-08 ENCOUNTER — TRANSCRIBE ORDER (OUTPATIENT)
Dept: SCHEDULING | Age: 77
End: 2022-11-08

## 2022-11-08 DIAGNOSIS — M51.36 DISC DEGENERATION, LUMBAR: Primary | ICD-10-CM

## 2022-11-15 ENCOUNTER — HOSPITAL ENCOUNTER (OUTPATIENT)
Dept: MRI IMAGING | Age: 77
Discharge: HOME | End: 2022-11-15
Attending: FAMILY MEDICINE
Payer: MEDICARE

## 2022-11-15 DIAGNOSIS — M51.36 DISC DEGENERATION, LUMBAR: ICD-10-CM

## 2022-11-15 PROCEDURE — 72148 MRI LUMBAR SPINE W/O DYE: CPT

## 2023-01-17 ENCOUNTER — TRANSCRIBE ORDER (OUTPATIENT)
Dept: SCHEDULING | Age: 78
End: 2023-01-17

## 2023-01-17 DIAGNOSIS — Z12.31 SCREENING MAMMOGRAM FOR HIGH-RISK PATIENT: Primary | ICD-10-CM

## 2023-03-01 ENCOUNTER — HOSPITAL ENCOUNTER (OUTPATIENT)
Dept: MAMMOGRAPHY | Age: 78
Discharge: HOME | End: 2023-03-01
Attending: FAMILY MEDICINE
Payer: MEDICARE

## 2023-03-01 DIAGNOSIS — Z12.31 SCREENING MAMMOGRAM FOR HIGH-RISK PATIENT: ICD-10-CM

## 2023-03-01 PROCEDURE — 77067 SCR MAMMO BI INCL CAD: CPT

## 2023-07-06 ENCOUNTER — OFFICE VISIT (OUTPATIENT)
Age: 78
End: 2023-07-06
Payer: MEDICARE

## 2023-07-06 VITALS
BODY MASS INDEX: 25.99 KG/M2 | HEART RATE: 84 BPM | WEIGHT: 156 LBS | SYSTOLIC BLOOD PRESSURE: 136 MMHG | DIASTOLIC BLOOD PRESSURE: 80 MMHG | HEIGHT: 65 IN | OXYGEN SATURATION: 97 %

## 2023-07-06 DIAGNOSIS — E78.2 MIXED HYPERLIPIDEMIA: ICD-10-CM

## 2023-07-06 DIAGNOSIS — I10 ESSENTIAL (PRIMARY) HYPERTENSION: Primary | ICD-10-CM

## 2023-07-06 PROCEDURE — 3079F DIAST BP 80-89 MM HG: CPT | Performed by: SPECIALIST

## 2023-07-06 PROCEDURE — 1090F PRES/ABSN URINE INCON ASSESS: CPT | Performed by: SPECIALIST

## 2023-07-06 PROCEDURE — 93005 ELECTROCARDIOGRAM TRACING: CPT | Performed by: SPECIALIST

## 2023-07-06 PROCEDURE — 93010 ELECTROCARDIOGRAM REPORT: CPT | Performed by: SPECIALIST

## 2023-07-06 PROCEDURE — 99214 OFFICE O/P EST MOD 30 MIN: CPT | Performed by: SPECIALIST

## 2023-07-06 PROCEDURE — G8400 PT W/DXA NO RESULTS DOC: HCPCS | Performed by: SPECIALIST

## 2023-07-06 PROCEDURE — G8427 DOCREV CUR MEDS BY ELIG CLIN: HCPCS | Performed by: SPECIALIST

## 2023-07-06 PROCEDURE — 1123F ACP DISCUSS/DSCN MKR DOCD: CPT | Performed by: SPECIALIST

## 2023-07-06 PROCEDURE — 1036F TOBACCO NON-USER: CPT | Performed by: SPECIALIST

## 2023-07-06 PROCEDURE — G8419 CALC BMI OUT NRM PARAM NOF/U: HCPCS | Performed by: SPECIALIST

## 2023-07-06 PROCEDURE — 3075F SYST BP GE 130 - 139MM HG: CPT | Performed by: SPECIALIST

## 2023-07-06 NOTE — PROGRESS NOTES
Yaneli Graves MD. [facility]          Patient: Mohit Lindsay  : 1945      Today's Date: 2023        HISTORY OF PRESENT ILLNESS:     History of Present Illness:  Here for FU. Back pain is biggest complaint. Also missing her . Class 2 ROSA walking up a hill - not exercising due to back problems. PAST MEDICAL HISTORY:     Past Medical History:   Diagnosis Date    Borderline diabetes     Essential hypertension     FH ischemic heart disease     Hyperlipidemia        Past Surgical History:   Procedure Laterality Date    CARDIAC CATHETERIZATION      COLONOSCOPY N/A 5/15/2018    COLONOSCOPY performed by Tato Correa MD at [address] [place] [address]s             CURRENT MEDICATIONS:    .  Current Outpatient Medications   Medication Sig Dispense Refill    amLODIPine (NORVASC) 10 MG tablet Take by mouth daily      aspirin-acetaminophen-caffeine (EXCEDRIN MIGRAINE) 250-250-65 MG per tablet Take 1 tablet by mouth as needed      Calcium Carbonate-Simethicone 750-80 MG CHEW Take by mouth      hydroCHLOROthiazide (HYDRODIURIL) 12.5 MG tablet TAKE 1 TABLET BY MOUTH EVERY DAY      meclizine (ANTIVERT) 25 MG tablet Take by mouth as needed      simvastatin (ZOCOR) 20 MG tablet Take by mouth      valsartan (DIOVAN) 160 MG tablet TAKE 1 TABLET BY MOUTH EVERY DAY      Calcium Carbonate-Vitamin D 600-3. 125 MG-MCG TABS Take by mouth daily (Patient not taking: Reported on 2023)       No current facility-administered medications for this visit.        Allergies   Allergen Reactions    Losartan Other (See Comments)     Dizziness    Sulfa Antibiotics      Other reaction(s): Unknown (comments)         SOCIAL HISTORY:     Social History     Tobacco Use    Smoking status: Never    Smokeless tobacco: Never   Substance Use Topics    Alcohol use: No    Drug use: No         FAMILY HISTORY:     Family History   Problem Relation Age of Onset    Heart Attack Father     Heart Attack Brother

## 2023-08-15 ENCOUNTER — HOSPITAL ENCOUNTER (EMERGENCY)
Facility: HOSPITAL | Age: 78
Discharge: HOME | End: 2023-08-15
Attending: EMERGENCY MEDICINE
Payer: MEDICARE

## 2023-08-15 VITALS
BODY MASS INDEX: 24.49 KG/M2 | WEIGHT: 147 LBS | OXYGEN SATURATION: 97 % | RESPIRATION RATE: 17 BRPM | HEART RATE: 82 BPM | TEMPERATURE: 98 F | SYSTOLIC BLOOD PRESSURE: 155 MMHG | DIASTOLIC BLOOD PRESSURE: 77 MMHG | HEIGHT: 65 IN

## 2023-08-15 DIAGNOSIS — R04.0 EPISTAXIS: Primary | ICD-10-CM

## 2023-08-15 PROCEDURE — 99282 EMERGENCY DEPT VISIT SF MDM: CPT

## 2023-08-15 ASSESSMENT — PAIN SCALES - GENERAL: PAINLEVEL_OUTOF10: 0

## 2023-08-15 ASSESSMENT — PAIN - FUNCTIONAL ASSESSMENT: PAIN_FUNCTIONAL_ASSESSMENT: 0-10

## 2023-08-15 NOTE — DISCHARGE INSTRUCTIONS
Thank you for allowing us to provide you with medical care today. We realize that you have many choices for your emergency care needs. We thank you for choosing [facility]. Please choose us in the future for any continued health care needs. The exam and treatment you received in the Emergency Department were for an emergent problem and are not intended as complete care. It is important that you follow up with a doctor, nurse practitioner, or physician's assistant for ongoing care. If your symptoms worsen or you do not improve as expected and you are unable to reach your usual health care provider, you should return to the Emergency Department. We are available 24 hours a day. Please make an appointment with your health care provider(s) for follow up of your Emergency Department visit. Take this sheet with you when you go to your follow-up visit.

## 2023-08-15 NOTE — ED PROVIDER NOTES
[facility] EMERGENCY DEPT  EMERGENCY DEPARTMENT ENCOUNTER      Patient Name: Yolande Hartmann  MRN: [id number]  [address] [facility] 1945  Date of Evaluation: 8/15/2023  Physician: Allison Nichols MD    CHIEF COMPLAINT       Chief Complaint   Patient presents with    Epistaxis       HISTORY OF PRESENT ILLNESS   (Location/Symptom, Timing/Onset, Context/Setting, Quality, Duration, Modifying Factors, Severity)   Yolande Hartmann, 77 y.o., female     67-year-old female presents with a chief complaint of nosebleed. Patient has had intermittent nosebleeds for the last several days. The bleeding has now stopped. Nursing Notes were reviewed. REVIEW OF SYSTEMS    (Not required)   Review of Systems   HENT:  Positive for nosebleeds. PAST MEDICAL HISTORY     Past Medical History:   Diagnosis Date    Borderline diabetes     Essential hypertension     FH ischemic heart disease     Hyperlipidemia        SURGICAL HISTORY       Past Surgical History:   Procedure Laterality Date    CARDIAC CATHETERIZATION  1996    COLONOSCOPY N/A 5/15/2018    COLONOSCOPY performed by Goi Johnson MD at [address] [place] [address]      cataracts       CURRENT MEDICATIONS       Previous Medications    AMLODIPINE (NORVASC) 10 MG TABLET    Take by mouth daily    ASPIRIN-ACETAMINOPHEN-CAFFEINE (EXCEDRIN MIGRAINE) 250-250-65 MG PER TABLET    Take 1 tablet by mouth as needed    CALCIUM CARBONATE-SIMETHICONE 750-80 MG CHEW    Take by mouth    CALCIUM CARBONATE-VITAMIN D 600-3. 125 MG-MCG TABS    Take by mouth daily    HYDROCHLOROTHIAZIDE (HYDRODIURIL) 12.5 MG TABLET    TAKE 1 TABLET BY MOUTH EVERY DAY    MECLIZINE (ANTIVERT) 25 MG TABLET    Take by mouth as needed    SIMVASTATIN (ZOCOR) 20 MG TABLET    Take by mouth    VALSARTAN (DIOVAN) 160 MG TABLET    TAKE 1 TABLET BY MOUTH EVERY DAY       ALLERGIES     Losartan and Sulfa antibiotics    FAMILY HISTORY       Family History   Problem Relation Age of Onset    Heart Attack Father     Heart Attack Brother

## 2023-08-15 NOTE — ED TRIAGE NOTES
Patient arrived ambulatory via POV. Patient stated her nose started bleeding suddenly while sitting and lasted 15 minutes. Denies injury/trauma. Patient stated she has had 4 nosebleeds in the past 2 months. Bleeding stopped POA. Denies blood thinners.

## 2023-09-15 ENCOUNTER — OFFICE VISIT (OUTPATIENT)
Age: 78
End: 2023-09-15

## 2023-09-15 VITALS
SYSTOLIC BLOOD PRESSURE: 122 MMHG | WEIGHT: 147 LBS | OXYGEN SATURATION: 98 % | HEIGHT: 65 IN | RESPIRATION RATE: 16 BRPM | DIASTOLIC BLOOD PRESSURE: 68 MMHG | BODY MASS INDEX: 24.49 KG/M2 | HEART RATE: 73 BPM

## 2023-09-15 DIAGNOSIS — R04.0 EPISTAXIS: Primary | ICD-10-CM

## 2023-09-25 RX ORDER — VALSARTAN 160 MG/1
TABLET ORAL
Qty: 90 TABLET | Refills: 3 | Status: SHIPPED | OUTPATIENT
Start: 2023-09-25

## 2023-09-25 RX ORDER — HYDROCHLOROTHIAZIDE 12.5 MG/1
TABLET ORAL
Qty: 90 TABLET | Refills: 3 | Status: SHIPPED | OUTPATIENT
Start: 2023-09-25

## 2023-09-25 NOTE — TELEPHONE ENCOUNTER
Refill per VO of Dr. Lillian Odom  Last appt: 7/6/2023    Future Appointments   Date Time Provider 4600  [address]   7/11/2024  9:20 AM Aguila Gibson MD [facility] AMB       Requested Prescriptions     Signed Prescriptions Disp Refills    valsartan (DIOVAN) 160 MG tablet 90 tablet 3     Sig: TAKE 1 TABLET BY MOUTH EVERY DAY     Authorizing Provider: Aguila Gibson     Ordering User: Eulalia LEAL    hydroCHLOROthiazide (HYDRODIURIL) 12.5 MG tablet 90 tablet 3     Sig: TAKE 1 TABLET BY MOUTH EVERY DAY     Authorizing Provider: Aguila Gibson     Ordering User: Rahel Omer

## 2023-11-03 ENCOUNTER — TELEPHONE (OUTPATIENT)
Age: 78
End: 2023-11-03

## 2023-11-03 NOTE — TELEPHONE ENCOUNTER
Patient called request medication refills moving forward be submitted to     [facility]  [address] [place] [address] Street [address]  Tele# [phone]    Patient# [phone]

## 2024-01-04 RX ORDER — VALSARTAN 160 MG/1
160 TABLET ORAL DAILY
Qty: 90 TABLET | Refills: 3 | Status: SHIPPED | OUTPATIENT
Start: 2024-01-04

## 2024-01-04 NOTE — TELEPHONE ENCOUNTER
Patient is requesting a refill of Valsartan sent to a new pharmacy, patient has about a week left of medication.   [facility] Phone: [phone]

## 2024-03-05 ENCOUNTER — HOSPITAL ENCOUNTER (OUTPATIENT)
Facility: HOSPITAL | Age: 79
Discharge: HOME | End: 2024-03-08
Payer: MEDICARE

## 2024-03-05 DIAGNOSIS — Z12.31 VISIT FOR SCREENING MAMMOGRAM: ICD-10-CM

## 2024-03-05 PROCEDURE — 77063 BREAST TOMOSYNTHESIS BI: CPT

## 2024-05-22 ENCOUNTER — TRANSCRIBE ORDERS (OUTPATIENT)
Facility: HOSPITAL | Age: 79
End: 2024-05-22

## 2024-05-22 DIAGNOSIS — M19.012 PRIMARY OSTEOARTHRITIS OF LEFT SHOULDER: Primary | ICD-10-CM

## 2024-06-06 ENCOUNTER — HOSPITAL ENCOUNTER (OUTPATIENT)
Facility: HOSPITAL | Age: 79
Discharge: HOME | End: 2024-06-06
Attending: ORTHOPAEDIC SURGERY
Payer: MEDICARE

## 2024-06-06 DIAGNOSIS — M19.012 PRIMARY OSTEOARTHRITIS OF LEFT SHOULDER: ICD-10-CM

## 2024-06-06 PROCEDURE — 73200 CT UPPER EXTREMITY W/O DYE: CPT

## 2024-06-26 ENCOUNTER — TELEPHONE (OUTPATIENT)
Age: 79
End: 2024-06-26

## 2024-06-26 NOTE — TELEPHONE ENCOUNTER
Patient states she would like a call back to get her a sooner appointment than September she says she doesn't feel like its fair being that she wasn't the one who cancelled.    Phone [phone]

## 2024-06-26 NOTE — TELEPHONE ENCOUNTER
Spoke to patient-    Patient would like to be scheduled  a sooner appointment with the NP at [facility].

## 2024-07-10 NOTE — PROGRESS NOTES
Patient: Tonya Perez  : 1945    Primary Cardiologist:Kyle Weeks MD. [facility]  Last Office Visit: 23      Today's Date: 2024        HISTORY OF PRESENT ILLNESS:     History of Present Illness:  Presents today for follow-up.   Denies chest pain. Continues with class 2 ROSA, denies worsening.   Continues with back problems. Denies palpitations.    PAST MEDICAL HISTORY:     Past Medical History:   Diagnosis Date    Borderline diabetes     Essential hypertension     FH ischemic heart disease     Hyperlipidemia        Past Surgical History:   Procedure Laterality Date    CARDIAC CATHETERIZATION      COLONOSCOPY N/A 5/15/2018    COLONOSCOPY performed by Nolberto Wilson MD at [facility] ENDOSCOPY    HEENT      cataracts             CURRENT MEDICATIONS:    .  Current Outpatient Medications   Medication Sig Dispense Refill    valsartan (DIOVAN) 160 MG tablet Take 1 tablet by mouth daily 90 tablet 3    hydroCHLOROthiazide (HYDRODIURIL) 12.5 MG tablet TAKE 1 TABLET BY MOUTH EVERY DAY 90 tablet 3    amLODIPine (NORVASC) 10 MG tablet Take by mouth daily      Calcium Carbonate-Simethicone 750-80 MG CHEW Take by mouth      Calcium Carbonate-Vitamin D 600-3.125 MG-MCG TABS Take by mouth daily      meclizine (ANTIVERT) 25 MG tablet Take by mouth as needed      aspirin-acetaminophen-caffeine (EXCEDRIN MIGRAINE) 250-250-65 MG per tablet Take 1 tablet by mouth as needed (Patient not taking: Reported on 9/15/2023)       No current facility-administered medications for this visit.       Allergies   Allergen Reactions    Losartan Other (See Comments)     Dizziness    Sulfa Antibiotics      Other reaction(s): Unknown (comments)         SOCIAL HISTORY:     Social History     Tobacco Use    Smoking status: Never    Smokeless tobacco: Never   Substance Use Topics    Alcohol use: No    Drug use: No         FAMILY HISTORY:     Family History   Problem Relation Age of Onset    Heart Attack Father     Heart Attack

## 2024-07-23 ENCOUNTER — OFFICE VISIT (OUTPATIENT)
Age: 79
End: 2024-07-23
Payer: MEDICARE

## 2024-07-23 VITALS
HEART RATE: 72 BPM | BODY MASS INDEX: 24.66 KG/M2 | WEIGHT: 148 LBS | SYSTOLIC BLOOD PRESSURE: 120 MMHG | DIASTOLIC BLOOD PRESSURE: 68 MMHG | OXYGEN SATURATION: 97 % | HEIGHT: 65 IN

## 2024-07-23 DIAGNOSIS — I10 ESSENTIAL (PRIMARY) HYPERTENSION: Primary | ICD-10-CM

## 2024-07-23 DIAGNOSIS — E78.2 MIXED HYPERLIPIDEMIA: ICD-10-CM

## 2024-07-23 PROCEDURE — G8420 CALC BMI NORM PARAMETERS: HCPCS

## 2024-07-23 PROCEDURE — G8427 DOCREV CUR MEDS BY ELIG CLIN: HCPCS

## 2024-07-23 PROCEDURE — 93010 ELECTROCARDIOGRAM REPORT: CPT

## 2024-07-23 PROCEDURE — 3078F DIAST BP <80 MM HG: CPT

## 2024-07-23 PROCEDURE — 1036F TOBACCO NON-USER: CPT

## 2024-07-23 PROCEDURE — 99214 OFFICE O/P EST MOD 30 MIN: CPT

## 2024-07-23 PROCEDURE — 3074F SYST BP LT 130 MM HG: CPT

## 2024-07-23 PROCEDURE — 93005 ELECTROCARDIOGRAM TRACING: CPT

## 2024-07-23 PROCEDURE — 1123F ACP DISCUSS/DSCN MKR DOCD: CPT

## 2024-07-23 PROCEDURE — G8400 PT W/DXA NO RESULTS DOC: HCPCS

## 2024-07-23 PROCEDURE — 1090F PRES/ABSN URINE INCON ASSESS: CPT

## 2024-07-23 NOTE — PROGRESS NOTES
Chief Complaint   Patient presents with    Hypertension    Cholesterol Problem     Vitals:    07/23/24 1304   BP: 120/68   Site: Left Upper Arm   Position: Sitting   Pulse: 72   SpO2: 97%   Weight: 67.1 kg (148 lb)   Height: 1.651 m (5' 5\")     Chest pain: DENIED     Recent hospital stays: DENIED     Refills: DENIED

## 2024-08-02 ENCOUNTER — OFFICE VISIT (OUTPATIENT)
Age: 79
End: 2024-08-02

## 2024-08-02 VITALS
SYSTOLIC BLOOD PRESSURE: 122 MMHG | HEIGHT: 65 IN | HEART RATE: 84 BPM | BODY MASS INDEX: 24.66 KG/M2 | DIASTOLIC BLOOD PRESSURE: 72 MMHG | WEIGHT: 148 LBS | OXYGEN SATURATION: 99 %

## 2024-08-02 DIAGNOSIS — R04.0 EPISTAXIS: Primary | ICD-10-CM

## 2024-08-02 DIAGNOSIS — I10 ESSENTIAL HYPERTENSION: ICD-10-CM

## 2024-08-02 NOTE — PROGRESS NOTES
Otolaryngology-Head and Neck Surgery  Follow Up Patient Visit     Patient: Tonya Perez  YOB: 1945  MRN: [id number]  Date of Service:  8/2/2024    Chief Complaint:   Chief Complaint   Patient presents with    Follow-up     Nose bleeds have been bad and lasting hours or more at a time   Left nostril          History of Present Illness: Tonya Perez is a 79 y.o. female who was last seen 1 year ago with epistaxis    Had done well until last month when has developed new onset of intermittent left epistaxis  Episodes seem to occur on Tuesdays  Severe nosebleed lasting for several minutes or longer  Has tried OTC nasal packs with continued epistaxis despite this    Most recent nosebleed was earlier this week on Tuesday    Seen recently by her cardiologist, no blood pressure concerns    Does not take any blood thinners    Past Medical History:  Past Medical History:   Diagnosis Date    Borderline diabetes     Essential hypertension     FH ischemic heart disease     Hyperlipidemia        Past Surgical History:   Past Surgical History:   Procedure Laterality Date    CARDIAC CATHETERIZATION  1996    COLONOSCOPY N/A 5/15/2018    COLONOSCOPY performed by Nolberto Wilson MD at [facility] ENDOSCOPY    HEENT      cataracts       Medications:   Current Outpatient Medications   Medication Instructions    amLODIPine (NORVASC) 10 MG tablet Oral, DAILY    aspirin-acetaminophen-caffeine (EXCEDRIN MIGRAINE) 250-250-65 MG per tablet 1 tablet, PRN    Calcium Carbonate-Simethicone 750-80 MG CHEW Oral    Calcium Carbonate-Vitamin D 600-3.125 MG-MCG TABS Oral, DAILY    hydroCHLOROthiazide (HYDRODIURIL) 12.5 MG tablet TAKE 1 TABLET BY MOUTH EVERY DAY    meclizine (ANTIVERT) 25 MG tablet Oral, PRN    valsartan (DIOVAN) 160 mg, Oral, DAILY       Allergies:   Allergies   Allergen Reactions    Losartan Other (See Comments)     Dizziness    Sulfa Antibiotics      Other reaction(s): Unknown (comments)       Social History:

## 2024-08-16 ENCOUNTER — OFFICE VISIT (OUTPATIENT)
Age: 79
End: 2024-08-16

## 2024-08-16 VITALS
BODY MASS INDEX: 24.66 KG/M2 | SYSTOLIC BLOOD PRESSURE: 110 MMHG | WEIGHT: 148 LBS | DIASTOLIC BLOOD PRESSURE: 66 MMHG | HEART RATE: 82 BPM | HEIGHT: 65 IN

## 2024-08-16 DIAGNOSIS — R04.0 EPISTAXIS: Primary | ICD-10-CM

## 2024-08-16 DIAGNOSIS — I10 ESSENTIAL HYPERTENSION: ICD-10-CM

## 2024-08-16 RX ORDER — SIMVASTATIN 10 MG
10 TABLET ORAL NIGHTLY
COMMUNITY

## 2024-08-16 NOTE — PROGRESS NOTES
Otolaryngology-Head and Neck Surgery  Follow Up Patient Visit     Patient: Tonya Perez  YOB: 1945  MRN: [id number]  Date of Service:  8/16/2024    Chief Complaint:   Chief Complaint   Patient presents with    Epistaxis     Had one last Thursday again. Went a little over a week without one. Cannot drive to [place], if silver nitrate doesn't work this time, then she will ask her daughter to drive her to  office to try Bipolar machine cauterization.      Interval hx 8/16/2024  Initially ok but then had another severe epistaxis last Thursday    History of Present Illness: Tonya Perez is a 79 y.o. female who was last seen 1 year ago with epistaxis    Had done well until last month when has developed new onset of intermittent left epistaxis  Episodes seem to occur on Tuesdays  Severe nosebleed lasting for several minutes or longer  Has tried OTC nasal packs with continued epistaxis despite this    Most recent nosebleed was earlier this week on Tuesday    Seen recently by her cardiologist, no blood pressure concerns    Does not take any blood thinners    Past Medical History:  Past Medical History:   Diagnosis Date    Borderline diabetes     Essential hypertension     FH ischemic heart disease     Hyperlipidemia        Past Surgical History:   Past Surgical History:   Procedure Laterality Date    CARDIAC CATHETERIZATION  1996    COLONOSCOPY N/A 5/15/2018    COLONOSCOPY performed by Nolberto Wilson MD at [facility] ENDOSCOPY    HEENT      cataracts       Medications:   Current Outpatient Medications   Medication Instructions    amLODIPine (NORVASC) 10 MG tablet Oral, DAILY    Calcium Carbonate-Simethicone 750-80 MG CHEW Oral    Calcium Carbonate-Vitamin D 600-3.125 MG-MCG TABS Oral, DAILY    hydroCHLOROthiazide (HYDRODIURIL) 12.5 MG tablet TAKE 1 TABLET BY MOUTH EVERY DAY    meclizine (ANTIVERT) 25 MG tablet Oral, PRN    simvastatin (ZOCOR) 10 mg, Oral, NIGHTLY    valsartan (DIOVAN) 160 mg, Oral,

## 2024-09-20 ENCOUNTER — OFFICE VISIT (OUTPATIENT)
Age: 79
End: 2024-09-20
Payer: MEDICARE

## 2024-09-20 VITALS
HEART RATE: 79 BPM | SYSTOLIC BLOOD PRESSURE: 120 MMHG | HEIGHT: 65 IN | RESPIRATION RATE: 16 BRPM | BODY MASS INDEX: 24.83 KG/M2 | OXYGEN SATURATION: 96 % | DIASTOLIC BLOOD PRESSURE: 78 MMHG | WEIGHT: 149 LBS

## 2024-09-20 DIAGNOSIS — R04.0 EPISTAXIS: Primary | ICD-10-CM

## 2024-09-20 PROCEDURE — 3078F DIAST BP <80 MM HG: CPT | Performed by: OTOLARYNGOLOGY

## 2024-09-20 PROCEDURE — 1090F PRES/ABSN URINE INCON ASSESS: CPT | Performed by: OTOLARYNGOLOGY

## 2024-09-20 PROCEDURE — 1123F ACP DISCUSS/DSCN MKR DOCD: CPT | Performed by: OTOLARYNGOLOGY

## 2024-09-20 PROCEDURE — G8427 DOCREV CUR MEDS BY ELIG CLIN: HCPCS | Performed by: OTOLARYNGOLOGY

## 2024-09-20 PROCEDURE — 99213 OFFICE O/P EST LOW 20 MIN: CPT | Performed by: OTOLARYNGOLOGY

## 2024-09-20 PROCEDURE — 1036F TOBACCO NON-USER: CPT | Performed by: OTOLARYNGOLOGY

## 2024-09-20 PROCEDURE — 3074F SYST BP LT 130 MM HG: CPT | Performed by: OTOLARYNGOLOGY

## 2024-09-20 PROCEDURE — G8400 PT W/DXA NO RESULTS DOC: HCPCS | Performed by: OTOLARYNGOLOGY

## 2024-09-20 PROCEDURE — G8420 CALC BMI NORM PARAMETERS: HCPCS | Performed by: OTOLARYNGOLOGY

## 2024-10-15 ENCOUNTER — HOSPITAL ENCOUNTER (OUTPATIENT)
Facility: HOSPITAL | Age: 79
Discharge: HOME | End: 2024-10-18
Attending: PHYSICAL MEDICINE & REHABILITATION
Payer: MEDICARE

## 2024-10-15 VITALS — WEIGHT: 149 LBS | BODY MASS INDEX: 24.79 KG/M2

## 2024-10-15 DIAGNOSIS — M54.16 LUMBAR RADICULOPATHY: ICD-10-CM

## 2024-10-15 DIAGNOSIS — M47.816 LUMBAR SPONDYLOSIS: ICD-10-CM

## 2024-10-15 PROCEDURE — 72148 MRI LUMBAR SPINE W/O DYE: CPT

## 2024-10-17 RX ORDER — HYDROCHLOROTHIAZIDE 12.5 MG/1
12.5 TABLET ORAL DAILY
Qty: 90 TABLET | Refills: 3 | Status: SHIPPED | OUTPATIENT
Start: 2024-10-17

## 2024-10-17 NOTE — TELEPHONE ENCOUNTER
Refill per VO of Dr. Weeks  Last appt: 7/6/2023    Future Appointments   Date Time Provider Department Center   7/31/2025  1:20 PM Kyle Weeks MD [facility] AMB       Requested Prescriptions     Signed Prescriptions Disp Refills    hydroCHLOROthiazide 12.5 MG tablet 90 tablet 3     Sig: TAKE ONE TABLET BY MOUTH ONE TIME DAILY     Authorizing Provider: KYLE WEEKS     Ordering User: BHARATHI PEREZ

## 2024-11-05 RX ORDER — VALSARTAN 160 MG/1
160 TABLET ORAL DAILY
Qty: 90 TABLET | Refills: 3 | Status: SHIPPED | OUTPATIENT
Start: 2024-11-05

## 2024-12-03 ENCOUNTER — HOSPITAL ENCOUNTER (OUTPATIENT)
Facility: HOSPITAL | Age: 79
Discharge: HOME | End: 2024-12-06
Payer: MEDICARE

## 2024-12-03 DIAGNOSIS — Z78.0 ASYMPTOMATIC MENOPAUSE: ICD-10-CM

## 2024-12-03 PROCEDURE — 77080 DXA BONE DENSITY AXIAL: CPT

## 2025-01-29 ENCOUNTER — TRANSCRIBE ORDERS (OUTPATIENT)
Facility: HOSPITAL | Age: 80
End: 2025-01-29

## 2025-01-29 DIAGNOSIS — Z12.31 OTHER SCREENING MAMMOGRAM: Primary | ICD-10-CM

## 2025-03-06 ENCOUNTER — HOSPITAL ENCOUNTER (OUTPATIENT)
Facility: HOSPITAL | Age: 80
Discharge: HOME | End: 2025-03-09
Payer: MEDICARE

## 2025-03-06 DIAGNOSIS — Z12.31 OTHER SCREENING MAMMOGRAM: ICD-10-CM

## 2025-03-06 PROCEDURE — 77063 BREAST TOMOSYNTHESIS BI: CPT

## 2025-07-31 ENCOUNTER — OFFICE VISIT (OUTPATIENT)
Age: 80
End: 2025-07-31
Payer: MEDICARE

## 2025-07-31 VITALS
SYSTOLIC BLOOD PRESSURE: 138 MMHG | HEIGHT: 65 IN | DIASTOLIC BLOOD PRESSURE: 80 MMHG | HEART RATE: 70 BPM | BODY MASS INDEX: 25.99 KG/M2 | RESPIRATION RATE: 16 BRPM | WEIGHT: 156 LBS | OXYGEN SATURATION: 96 %

## 2025-07-31 DIAGNOSIS — I10 ESSENTIAL (PRIMARY) HYPERTENSION: Primary | ICD-10-CM

## 2025-07-31 DIAGNOSIS — E78.5 DYSLIPIDEMIA: ICD-10-CM

## 2025-07-31 PROCEDURE — 1090F PRES/ABSN URINE INCON ASSESS: CPT | Performed by: SPECIALIST

## 2025-07-31 PROCEDURE — 3075F SYST BP GE 130 - 139MM HG: CPT | Performed by: SPECIALIST

## 2025-07-31 PROCEDURE — G8427 DOCREV CUR MEDS BY ELIG CLIN: HCPCS | Performed by: SPECIALIST

## 2025-07-31 PROCEDURE — 99214 OFFICE O/P EST MOD 30 MIN: CPT | Performed by: SPECIALIST

## 2025-07-31 PROCEDURE — 93005 ELECTROCARDIOGRAM TRACING: CPT | Performed by: SPECIALIST

## 2025-07-31 PROCEDURE — 93010 ELECTROCARDIOGRAM REPORT: CPT | Performed by: SPECIALIST

## 2025-07-31 PROCEDURE — 1126F AMNT PAIN NOTED NONE PRSNT: CPT | Performed by: SPECIALIST

## 2025-07-31 PROCEDURE — 1160F RVW MEDS BY RX/DR IN RCRD: CPT | Performed by: SPECIALIST

## 2025-07-31 PROCEDURE — 1123F ACP DISCUSS/DSCN MKR DOCD: CPT | Performed by: SPECIALIST

## 2025-07-31 PROCEDURE — 3079F DIAST BP 80-89 MM HG: CPT | Performed by: SPECIALIST

## 2025-07-31 PROCEDURE — G8399 PT W/DXA RESULTS DOCUMENT: HCPCS | Performed by: SPECIALIST

## 2025-07-31 PROCEDURE — G8419 CALC BMI OUT NRM PARAM NOF/U: HCPCS | Performed by: SPECIALIST

## 2025-07-31 PROCEDURE — 1159F MED LIST DOCD IN RCRD: CPT | Performed by: SPECIALIST

## 2025-07-31 PROCEDURE — 1036F TOBACCO NON-USER: CPT | Performed by: SPECIALIST

## 2025-07-31 RX ORDER — VALSARTAN 160 MG/1
160 TABLET ORAL 2 TIMES DAILY
Qty: 180 TABLET | Refills: 3 | Status: SHIPPED | OUTPATIENT
Start: 2025-07-31

## 2025-07-31 NOTE — PROGRESS NOTES
had concerns including Hypertension and Hyperlipidemia.    Vitals:    07/31/25 1251   BP: 138/80   BP Site: Left Upper Arm   Patient Position: Sitting   Pulse: 70   Resp: 16   SpO2: 96%   Weight: 70.8 kg (156 lb)   Height: 1.651 m (5' 5\")        Chest pain No    Refills No        1. Have you been to the ER, urgent care clinic since your last visit? No       Hospitalized since your last visit? No       Where?        Date?

## 2025-07-31 NOTE — PROGRESS NOTES
Kyle Weeks MD. [facility]          Patient: Tonya Perez  : 1945      Today's Date: 2025        HISTORY OF PRESENT ILLNESS:     History of Present Illness:  Denies CP or sig SOB.  Class 1-2 ROSA.   Biggest complaint is back pain.          PAST MEDICAL HISTORY:     Past Medical History:   Diagnosis Date    Borderline diabetes     Essential hypertension     FH ischemic heart disease     Hyperlipidemia        Past Surgical History:   Procedure Laterality Date    CARDIAC CATHETERIZATION      COLONOSCOPY N/A 5/15/2018    COLONOSCOPY performed by Nolberto Wilson MD at [facility] ENDOSCOPY    HEENT      cataracts             CURRENT MEDICATIONS:    .  Current Outpatient Medications   Medication Sig Dispense Refill    valsartan (DIOVAN) 160 MG tablet TAKE ONE TABLET BY MOUTH ONE TIME DAILY 90 tablet 3    hydroCHLOROthiazide 12.5 MG tablet TAKE ONE TABLET BY MOUTH ONE TIME DAILY 90 tablet 3    simvastatin (ZOCOR) 10 MG tablet Take 1 tablet by mouth nightly      amLODIPine (NORVASC) 10 MG tablet Take by mouth daily      Calcium Carbonate-Simethicone 750-80 MG CHEW Take by mouth      Calcium Carbonate-Vitamin D 600-3.125 MG-MCG TABS Take by mouth daily      meclizine (ANTIVERT) 25 MG tablet Take by mouth as needed       No current facility-administered medications for this visit.       Allergies   Allergen Reactions    Losartan Other (See Comments) and Hives     Dizziness    Other reaction(s): dizziness    Sulfa Antibiotics Hives     Other reaction(s): Unknown (comments)         SOCIAL HISTORY:     Social History     Tobacco Use    Smoking status: Never    Smokeless tobacco: Never   Substance Use Topics    Alcohol use: No    Drug use: No         FAMILY HISTORY:     Family History   Problem Relation Age of Onset    Heart Attack Father     Heart Attack Brother     Diabetes Father     Diabetes Mother          REVIEW OF SYMPTOMS:     Review of Symptoms:  Constitutional: Negative for fever, chills  HEENT:

## 2025-08-20 DIAGNOSIS — I10 ESSENTIAL (PRIMARY) HYPERTENSION: ICD-10-CM

## 2025-08-20 LAB
ALBUMIN SERPL-MCNC: 4.2 G/DL (ref 3.5–5.2)
ALBUMIN/GLOB SERPL: 1.4 (ref 1.1–2.2)
ALP SERPL-CCNC: 71 U/L (ref 35–104)
ALT SERPL-CCNC: 20 U/L (ref 10–35)
ANION GAP SERPL CALC-SCNC: 10 MMOL/L (ref 2–14)
AST SERPL-CCNC: 25 U/L (ref 10–35)
BILIRUB SERPL-MCNC: 0.4 MG/DL (ref 0–1.2)
BUN SERPL-MCNC: 11 MG/DL (ref 8–23)
BUN/CREAT SERPL: 18 (ref 12–20)
CALCIUM SERPL-MCNC: 9.3 MG/DL (ref 8.8–10.2)
CHLORIDE SERPL-SCNC: 97 MMOL/L (ref 98–107)
CO2 SERPL-SCNC: 28 MMOL/L (ref 20–29)
CREAT SERPL-MCNC: 0.62 MG/DL (ref 0.6–1)
GLOBULIN SER CALC-MCNC: 2.9 G/DL (ref 2–4)
GLUCOSE SERPL-MCNC: 132 MG/DL (ref 65–100)
POTASSIUM SERPL-SCNC: 4.6 MMOL/L (ref 3.5–5.1)
PROT SERPL-MCNC: 7.1 G/DL (ref 6.4–8.3)
SODIUM SERPL-SCNC: 135 MMOL/L (ref 136–145)
TSH, 3RD GENERATION: 4.67 UIU/ML (ref 0.27–4.2)

## 2025-08-26 LAB
ALDOST SERPL-MCNC: 10.9 NG/DL (ref 0–30)
ALDOST/RENIN PLAS-RTO: 15.4 (ref 0–30)
RENIN PLAS-CCNC: 0.71 NG/ML/HR (ref 0.17–5.38)

## (undated) DEVICE — SYR 3ML LL TIP 1/10ML GRAD --

## (undated) DEVICE — SYR 5ML 1/5 GRAD LL NSAF LF --

## (undated) DEVICE — CANN NASAL O2 CAPNOGRAPHY AD -- FILTERLINE

## (undated) DEVICE — TRAP SUC MUCOUS 70ML -- MEDICHOICE MEDLINE

## (undated) DEVICE — BASIN EMSIS 16OZ GRAPHITE PLAS KID SHP MOLD GRAD FOR ORAL

## (undated) DEVICE — SYRINGE 50ML E/T

## (undated) DEVICE — BAG SPEC BIOHZRD 10 X 10 IN --

## (undated) DEVICE — KENDALL RADIOLUCENT FOAM MONITORING ELECTRODE -RECTANGULAR SHAPE: Brand: KENDALL

## (undated) DEVICE — Device

## (undated) DEVICE — BAG BELONG PT PERS CLEAR HANDL

## (undated) DEVICE — CATH IV AUTOGRD BC PNK 20GA 25 -- INSYTE

## (undated) DEVICE — ADULT SPO2 SENSOR: Brand: NELLCOR

## (undated) DEVICE — SNARE ENDOSCP M L240CM W27MM SHTH DIA2.4MM CHN 2.8MM OVL

## (undated) DEVICE — SET ADMIN 16ML TBNG L100IN 2 Y INJ SITE IV PIGGY BK DISP

## (undated) DEVICE — SOLIDIFIER MEDC 1200ML -- CONVERT TO 356117

## (undated) DEVICE — 1200 GUARD II KIT W/5MM TUBE W/O VAC TUBE: Brand: GUARDIAN

## (undated) DEVICE — KIT COLON W/ 1.1OZ LUB AND 2 END

## (undated) DEVICE — 3M™ CUROS™ DISINFECTING CAP FOR NEEDLELESS CONNECTORS 270/CARTON 20 CARTONS/CASE CFF1-270: Brand: CUROS™

## (undated) DEVICE — CONTAINER SPEC 20 ML LID NEUT BUFF FORMALIN 10 % POLYPR STS